# Patient Record
Sex: FEMALE | Race: WHITE | NOT HISPANIC OR LATINO | Employment: FULL TIME | URBAN - METROPOLITAN AREA
[De-identification: names, ages, dates, MRNs, and addresses within clinical notes are randomized per-mention and may not be internally consistent; named-entity substitution may affect disease eponyms.]

---

## 2017-05-11 LAB
HBV SURFACE AG SERPL QL IA: NONREACTIVE
HIV 1+2 AB+HIV1 P24 AG SERPL QL IA: NON REACTIVE
RUBELLA ANTIBODY IGG QUANTITATIVE: NORMAL IU/ML
T PALLIDUM IGG SER QL: NON REACTIVE

## 2017-10-21 ENCOUNTER — HOSPITAL ENCOUNTER (OUTPATIENT)
Facility: CLINIC | Age: 27
Discharge: HOME | End: 2017-10-21
Attending: OBSTETRICS & GYNECOLOGY | Admitting: OBSTETRICS & GYNECOLOGY
Payer: COMMERCIAL

## 2017-10-21 VITALS
TEMPERATURE: 97.9 F | SYSTOLIC BLOOD PRESSURE: 117 MMHG | HEIGHT: 67 IN | RESPIRATION RATE: 16 BRPM | DIASTOLIC BLOOD PRESSURE: 77 MMHG
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
## 2017-10-21 PROCEDURE — 59025 FETAL NON-STRESS TEST: CPT

## 2017-10-21 PROCEDURE — 99213 OFFICE O/P EST LOW 20 MIN: CPT | Mod: 25

## 2017-10-21 RX ORDER — ONDANSETRON 2 MG/ML
4 INJECTION INTRAMUSCULAR; INTRAVENOUS EVERY 6 HOURS PRN
Status: DISCONTINUED | OUTPATIENT
Start: 2017-10-21 | End: 2017-10-21 | Stop reason: HOSPADM

## 2017-10-21 RX ORDER — PRENATAL VIT/IRON FUM/FOLIC AC 27MG-0.8MG
1 TABLET ORAL DAILY
COMMUNITY
End: 2019-09-09

## 2017-10-21 NOTE — PROGRESS NOTES
2017    Suzie Gorman  [id number]            OB Admit History & Physical      Ms. Gorman  is here having tripped on the way in to a [employer] restaurant.  She landed on her right side, feeling that she scuffed her right hand, has a swollen right knee but did not hurt or land on her abdomen.  Baby is active, no contractions, no bleeding.  No abdominal pain    She has noticed baby active    No LMP recorded. Patient is pregnant.   Her Estimated Date of Delivery: 2017  , making her 35w3d  wks.      There is no height or weight on file to calculate BMI.  Her prenatal course has been complicated by non centrally located umbilical cord.    See prenatal for labs.  A+    Estimated fetal weight=        She is a 27 year old   Her OB history:   Obstetric History       T0      L0     SAB0   TAB0   Ectopic0   Multiple0   Live Births0       # Outcome Date GA Lbr Piotr/2nd Weight Sex Delivery Anes PTL Lv   1 Current                      No past medical history on file.     No past surgical history on file.      No current outpatient prescriptions on file.       Allergies: Review of patient's allergies indicates no known allergies.      REVIEW OF SYSTEMS:  NEUROLOGIC:  Negative  EYES:  Negative  ENT:  Negative  GI:  Negative  BREAST:  Negative  :  Negative  GYN:  Negative  CV:  Negative  PULMONARY:  Negative  MUSCULOSKELETAL:  Negative  PSYCH:  Negative        Social History     Social History     Marital status: N/A     Spouse name: N/A     Number of children: N/A     Years of education: N/A     Occupational History     Not on file.     Social History Main Topics     Smoking status: Never Smoker     Smokeless tobacco: Never Used     Alcohol use No     Drug use: Not on file     Sexual activity: Not on file     Other Topics Concern     Not on file     Social History Narrative     No narrative on file      No family history on file.          Vitals:   FHT 140s Category one,    With  contractions rarely    Alert Awake in NAD  HEENT grossly normal  Neck: no lymphadenopathy or thryoidomegaly  ABD gravid, non tender on exam   Pelvic:  no fluid noted, no blood noted  Cervix is closed   EXT:  no edema or calf tenderness  Neuro:  intact    Assessment:  IUP at 35w3d  With recent fall  PNC complicated by fall, abnormally located placental cord insertion, planned induction at 39 weeks, Growth curve changes with drop in overall growth    Plan:  DC to home.  Kick counts  Return if bleeding of lamonte    [unfilled]      Darren Rubio MD  Dept of OB/GYN  October 21, 2017

## 2017-10-21 NOTE — PROVIDER NOTIFICATION
10/21/17 1300   Provider Notification   Provider Name/Title Dr. Rubio   Method of Notification At Bedside   Request Evaluate in Person     Dr. Rubio at the bedside, reviewed fetal monitor tracing, reactive NST noted. Patient's cx examined and was closed and no bleeding noted. Patient denies any contractions. Orders for discharge received.

## 2017-10-21 NOTE — IP AVS SNAPSHOT
MRN:[id number]                      After Visit Summary   10/21/2017    Suzie Gorman    MRN: [id number]           Thank you!     Thank you for choosing [facility] for your care. Our goal is always to provide you with excellent care. Hearing back from our patients is one way we can continue to improve our services. Please take a few minutes to complete the written survey that you may receive in the mail after you visit. If you would like to speak to someone directly about your visit please contact Patient Relations at [phone]. Thank you!          Patient Information     Date Of Birth          1990        About your hospital stay     You were admitted on:  2017 You last received care in the:  [facility] Labor and Delivery    You were discharged on:  2017       Who to Call     For medical emergencies, please call 911.  For non-urgent questions about your medical care, please call your primary care provider or clinic, None          Attending Provider     Provider Darren Jansen OB/Gyn       Primary Care Provider    None Specified      Further instructions from your care team       Data: Patient [facility] at 1155.   Reason for maternal/fetal assessment per patient is Fall  . Patient is a . Prenatal record reviewed.      Obstetric History       T0      L0     SAB0   TAB0   Ectopic0   Multiple0   Live Births0       # Outcome Date GA Lbr Piotr/2nd Weight Sex Delivery Anes PTL Lv   1 Current                  Medical History: No past medical history on file.. Gestational Age 35w3d. VSS. Cervix: closed.  Fetal movement present. Patient denies cramping, backache, vaginal discharge, pelvic pressure, UTI symptoms, GI problems, bloody show, vaginal bleeding, edema, headache, visual disturbances, epigastric or URQ pain, abdominal pain, rupture of membranes. Support person Ja present.  Action: Verbal  "consent for EFM. Triage assessment completed. EFM applied for fetal non-stress test do to patient falling on her right knee and right elbow. Uterine assessment done with external uterine monitor. Fetal assessment: Presumed adequate fetal oxygenation documented (see flow record). Patient education given on fetal movement counts and . Patient instructed to report change in fetal movement, vaginal leaking of fluid or bleeding, abdominal pain, or any concerns related to the pregnancy to her nurse/physician.   Response: Dr. Rubio informed of patient's admission. Plan per provider is to obtain non-stress test and cervical exam. Patient verbalized understanding of education and verbalized agreement with plan. Discharged ambulatory at 1330.        Pending Results     No orders found from 10/19/2017 to 10/22/2017.            Admission Information     Date & Time Provider Department Dept. Phone    10/21/2017 Darren Rubio [facility] [place] Labor and Delivery [phone]      Your Vitals Were     Temperature Respirations Height             97.9  F (36.6  C) (Oral) 16 1.702 m (5' 7\")         [facility] Information     [facility] lets you send messages to your doctor, view your test results, renew your prescriptions, schedule appointments and more. To sign up, go to [email or web address] . Click on \"Log in\" on the left side of the screen, which will take you to the Welcome page. Then click on \"Sign up Now\" on the right side of the page.     You will be asked to enter the access code listed below, as well as some personal information. Please follow the directions to create your username and password.     Your access code is: [email or web address]  Expires: 2018  1:25 PM     Your access code will  in 90 days. If you need help or a new code, please call your [facility] or [phone].        Care EveryWhere ID     This is your Care EveryWhere ID. This could be used by other organizations to access your [facility] medical " records  [id number]        Equal Access to Services     MARIA ISABEL TY : Hadii bright Caballero, wablaiseda miles, qalinnea ojeda, noelle sorto. So [facility] [phone].    ATENCIÓN: Si habla español, tiene a arenas disposición servicios gratuitos de asistencia lingüística. Llame al [phone].    We comply with applicable federal civil rights laws and [place] laws. We do not discriminate on the basis of race, color, national origin, age, disability, sex, sexual orientation, or gender identity.               Review of your medicines      UNREVIEWED medicines. Ask your doctor about these medicines        Dose / Directions    prenatal multivitamin plus iron 27-0.8 MG Tabs per tablet        Dose:  1 tablet   Take 1 tablet by mouth daily   Refills:  0                Protect others around you: Learn how to safely use, store and throw away your medicines at www.disposemymeds.org.             Medication List: This is a list of all your medications and when to take them. Check marks below indicate your daily home schedule. Keep this list as a reference.      Medications           Morning Afternoon Evening Bedtime As Needed    prenatal multivitamin plus iron 27-0.8 MG Tabs per tablet   Take 1 tablet by mouth daily

## 2017-10-21 NOTE — PLAN OF CARE
Reviewed printed discharge instructions with patient and verbalized understanding of them. Reviewed kick counts and patient reports she has been doing these. Patient home undelivered, accompanied with her boyfriend Ja. Instructed to notify MD with changes in fetal movement, contractions, SROM or any other concerns.

## 2017-10-21 NOTE — PLAN OF CARE
presents to L&D for evaluation after she fell on her on right knee and right elbow/shoulder and not sure if abdomen hit. Denies LOF or vaginal bleeding. Patient not aware of fetal monitor but does report she has an anterior placenta and placement of umbilical cord was brought up as a concern. External fetal monitor applied and data base completed..

## 2017-10-21 NOTE — IP AVS SNAPSHOT
[facility] Labor and Delivery    [address]    [facility] [address]    Phone:  [phone]    Fax:  [phone]                                       After Visit Summary   10/21/2017    Suzie Gorman    MRN: [id number]           After Visit Summary Signature Page     I have received my discharge instructions, and my questions have been answered. I have discussed any challenges I see with this plan with the nurse or doctor.    ..........................................................................................................................................  Patient/Patient Representative Signature      ..........................................................................................................................................  Patient Representative Print Name and Relationship to Patient    ..................................................               ................................................  Date                                            Time    ..........................................................................................................................................  Reviewed by Signature/Title    ...................................................              ..............................................  Date                                                            Time

## 2017-10-26 LAB — GROUP B STREP PCR: NEGATIVE

## 2017-11-13 ENCOUNTER — HOSPITAL ENCOUNTER (OUTPATIENT)
Facility: CLINIC | Age: 27
Discharge: HOME | End: 2017-11-13
Attending: OBSTETRICS & GYNECOLOGY | Admitting: OBSTETRICS & GYNECOLOGY
Payer: COMMERCIAL

## 2017-11-13 VITALS — RESPIRATION RATE: 18 BRPM | TEMPERATURE: 97.8 F | SYSTOLIC BLOOD PRESSURE: 119 MMHG | DIASTOLIC BLOOD PRESSURE: 73 MMHG

## 2017-11-13 PROBLEM — Z36.89 ENCOUNTER FOR TRIAGE IN PREGNANT PATIENT: Status: ACTIVE | Noted: 2017-11-13

## 2017-11-13 LAB — A1 MICROGLOB PLACENTAL VAG QL: NEGATIVE

## 2017-11-13 PROCEDURE — 84112 EVAL AMNIOTIC FLUID PROTEIN: CPT | Performed by: OBSTETRICS & GYNECOLOGY

## 2017-11-13 PROCEDURE — 99214 OFFICE O/P EST MOD 30 MIN: CPT

## 2017-11-13 NOTE — IP AVS SNAPSHOT
MRN:[id number]                      After Visit Summary   11/13/2017    Suzie Gorman    MRN: [id number]           Thank you!     Thank you for choosing [facility] for your care. Our goal is always to provide you with excellent care. Hearing back from our patients is one way we can continue to improve our services. Please take a few minutes to complete the written survey that you may receive in the mail after you visit. If you would like to speak to someone directly about your visit please contact Patient Relations at [phone]. Thank you!          Patient Information     Date Of Birth          1990        About your hospital stay     You were admitted on:  November 13, 2017 You last received care in the:  [facility] Labor and Delivery    You were discharged on:  November 13, 2017       Who to Call     For medical emergencies, please call 911.  For non-urgent questions about your medical care, please call your primary care provider or clinic, [phone]          Attending Provider     Provider Specialty    Christina Rodriguez,  OB/Gyn       Primary Care Provider Office Phone # Fax #    Darren Rubio [phone]      Further instructions from your care team       Discharge Instruction for Undelivered Patients      You were seen for: Membrane Assessment  We Consulted: Dr. Rodriguez  You had (Test or Medicine):amnisure, fetal and uterine monitoing.     Diet:   Drink 8 to 12 glasses of liquids (milk, juice, water) every day.  You may eat meals and snacks.     Activity:  Call your doctor or nurse midwife if your baby is moving less than usual.     Call your provider if you notice:  Swelling in your face or increased swelling in your hands or legs.  Headaches that are not relieved by Tylenol (acetaminophen).  Changes in your vision (blurring: seeing spots or stars.)  Nausea (sick to your stomach) and vomiting (throwing up).   Weight gain of 5 pounds or more  "per week.  Heartburn that doesn't go away.  Signs of bladder infection: pain when you urinate (use the toilet), need to go more often and more urgently.  The bag of alonzo (rupture of membranes) breaks, or you notice leaking in your underwear.  Bright red blood in your underwear.  Abdominal (lower belly) or stomach pain.  For first baby: Contractions (tightening) less than 5 minutes apart for one hour or more.  Second (plus) baby: Contractions (tightening) less than 10 minutes apart and getting stronger.  *If less than 34 weeks: Contractions (tightenings) more than 6 times in one hour.  Increase or change in vaginal discharge (note the color and amount)  Other: Call doctor if further leaking of fluids, questions or concerns    Follow-up:  Induction scheduled for Wednesday, November 15.          Pending Results     No orders found from 2017 to 2017.            Admission Information     Date & Time Provider Department Dept. Phone    2017 Christina Rodriguez,  [facility] Labor and Delivery [phone]      Your Vitals Were     Blood Pressure Temperature Respirations             119/73 97.8  F (36.6  C) (Oral) 18         MyChart Information     [facility] lets you send messages to your doctor, view your test results, renew your prescriptions, schedule appointments and more. To sign up, go to [email or web address] . Click on \"Log in\" on the left side of the screen, which will take you to the Welcome page. Then click on \"Sign up Now\" on the right side of the page.     You will be asked to enter the access code listed below, as well as some personal information. Please follow the directions to create your username and password.     Your access code is: [email or web address]  Expires: 2018 12:25 PM     Your access code will  in 90 days. If you need help or a new code, please call your [facility] or [phone].        Care EveryWhere ID     This is your Care EveryWhere ID. This could be used " by other organizations to access your [place] medical records  [id number]        Equal Access to Services     MARIA ISABEL TY : Hadii bright Caballero, lucy aquino, cherelle lockemaabigail ojeda, noelle sorto. So [facility] [phone].    ATENCIÓN: Si habla español, tiene a arenas disposición servicios gratuitos de asistencia lingüística. Llame al [phone].    We comply with applicable federal civil rights laws and [place] laws. We do not discriminate on the basis of race, color, national origin, age, disability, sex, sexual orientation, or gender identity.               Review of your medicines      UNREVIEWED medicines. Ask your doctor about these medicines        Dose / Directions    prenatal multivitamin plus iron 27-0.8 MG Tabs per tablet        Dose:  1 tablet   Take 1 tablet by mouth daily   Refills:  0                Protect others around you: Learn how to safely use, store and throw away your medicines at www.disposemymeds.org.             Medication List: This is a list of all your medications and when to take them. Check marks below indicate your daily home schedule. Keep this list as a reference.      Medications           Morning Afternoon Evening Bedtime As Needed    prenatal multivitamin plus iron 27-0.8 MG Tabs per tablet   Take 1 tablet by mouth daily

## 2017-11-13 NOTE — PLAN OF CARE
, 38 5/7 weeks gestation. Had episode of leaking fluid at 0430. Monitors explained and applied. Occasional contraction noted on monitor, denies feeling. Amnisure specimen to lab. Amnisure negative. Reactive fetal tracing. Dr. Rodriguez updated per phone. Order received for discharge. Patient to return Wednesday for induction.

## 2017-11-13 NOTE — PROVIDER NOTIFICATION
11/13/17 1425   Provider Notification   Provider Name/Title Dr. Rodriguez   Method of Notification Phone   Request Evaluate - Remote   Amnisure negative, Dr. Rodriguez updatd per phone. Order received for discharge. Patient to call if further leaking questions or concerns. Discharge instructions reviewed with patient. Understanding verbalized. Patient discharged to home. Will return Wednesday for induction.

## 2017-11-13 NOTE — DISCHARGE INSTRUCTIONS
Discharge Instruction for Undelivered Patients      You were seen for: Membrane Assessment  We Consulted: Dr. Rodriguez  You had (Test or Medicine):amnisure, fetal and uterine monitoing.     Diet:   Drink 8 to 12 glasses of liquids (milk, juice, water) every day.  You may eat meals and snacks.     Activity:  Call your doctor or nurse midwife if your baby is moving less than usual.     Call your provider if you notice:  Swelling in your face or increased swelling in your hands or legs.  Headaches that are not relieved by Tylenol (acetaminophen).  Changes in your vision (blurring: seeing spots or stars.)  Nausea (sick to your stomach) and vomiting (throwing up).   Weight gain of 5 pounds or more per week.  Heartburn that doesn't go away.  Signs of bladder infection: pain when you urinate (use the toilet), need to go more often and more urgently.  The bag of alonzo (rupture of membranes) breaks, or you notice leaking in your underwear.  Bright red blood in your underwear.  Abdominal (lower belly) or stomach pain.  For first baby: Contractions (tightening) less than 5 minutes apart for one hour or more.  Second (plus) baby: Contractions (tightening) less than 10 minutes apart and getting stronger.  *If less than 34 weeks: Contractions (tightenings) more than 6 times in one hour.  Increase or change in vaginal discharge (note the color and amount)  Other: Call doctor if further leaking of fluids, questions or concerns    Follow-up:  Induction scheduled for Wednesday, November 15.

## 2017-11-13 NOTE — IP AVS SNAPSHOT
[facility] Labor and Delivery    [address]    [facility] [address]    Phone:  [phone]    Fax:  [phone]                                       After Visit Summary   11/13/2017    Suzie Gorman    MRN: [id number]           After Visit Summary Signature Page     I have received my discharge instructions, and my questions have been answered. I have discussed any challenges I see with this plan with the nurse or doctor.    ..........................................................................................................................................  Patient/Patient Representative Signature      ..........................................................................................................................................  Patient Representative Print Name and Relationship to Patient    ..................................................               ................................................  Date                                            Time    ..........................................................................................................................................  Reviewed by Signature/Title    ...................................................              ..............................................  Date                                                            Time

## 2017-11-15 ENCOUNTER — HOSPITAL ENCOUNTER (INPATIENT)
Facility: CLINIC | Age: 27
LOS: 3 days | Discharge: HOME HEALTH | End: 2017-11-18
Attending: OBSTETRICS & GYNECOLOGY | Admitting: OBSTETRICS & GYNECOLOGY
Payer: COMMERCIAL

## 2017-11-15 ENCOUNTER — ANESTHESIA EVENT (OUTPATIENT)
Dept: OBGYN | Facility: CLINIC | Age: 27
End: 2017-11-15
Payer: COMMERCIAL

## 2017-11-15 ENCOUNTER — ANESTHESIA (OUTPATIENT)
Dept: OBGYN | Facility: CLINIC | Age: 27
End: 2017-11-15
Payer: COMMERCIAL

## 2017-11-15 LAB
ABO + RH BLD: NORMAL
ABO + RH BLD: NORMAL
HGB BLD-MCNC: 11.1 G/DL (ref 11.7–15.7)
SPECIMEN EXP DATE BLD: NORMAL
T PALLIDUM IGG+IGM SER QL: NEGATIVE

## 2017-11-15 PROCEDURE — 25000125 ZZHC RX 250: Performed by: OBSTETRICS & GYNECOLOGY

## 2017-11-15 PROCEDURE — 86901 BLOOD TYPING SEROLOGIC RH(D): CPT | Performed by: OBSTETRICS & GYNECOLOGY

## 2017-11-15 PROCEDURE — 00HU33Z INSERTION OF INFUSION DEVICE INTO SPINAL CANAL, PERCUTANEOUS APPROACH: ICD-10-PCS | Performed by: OBSTETRICS & GYNECOLOGY

## 2017-11-15 PROCEDURE — 86900 BLOOD TYPING SEROLOGIC ABO: CPT | Performed by: OBSTETRICS & GYNECOLOGY

## 2017-11-15 PROCEDURE — 40000671 ZZH STATISTIC ANESTHESIA CASE

## 2017-11-15 PROCEDURE — 12000031 ZZH R&B OB CRITICAL

## 2017-11-15 PROCEDURE — 25000128 H RX IP 250 OP 636: Performed by: OBSTETRICS & GYNECOLOGY

## 2017-11-15 PROCEDURE — 3E033VJ INTRODUCTION OF OTHER HORMONE INTO PERIPHERAL VEIN, PERCUTANEOUS APPROACH: ICD-10-PCS | Performed by: OBSTETRICS & GYNECOLOGY

## 2017-11-15 PROCEDURE — 37000011 ZZH ANESTHESIA WARD SERVICE

## 2017-11-15 PROCEDURE — 25000125 ZZHC RX 250: Performed by: ANESTHESIOLOGY

## 2017-11-15 PROCEDURE — 25000128 H RX IP 250 OP 636

## 2017-11-15 PROCEDURE — S0020 INJECTION, BUPIVICAINE HYDRO: HCPCS | Performed by: ANESTHESIOLOGY

## 2017-11-15 PROCEDURE — 85018 HEMOGLOBIN: CPT | Performed by: OBSTETRICS & GYNECOLOGY

## 2017-11-15 PROCEDURE — 0KQM0ZZ REPAIR PERINEUM MUSCLE, OPEN APPROACH: ICD-10-PCS | Performed by: OBSTETRICS & GYNECOLOGY

## 2017-11-15 PROCEDURE — 86780 TREPONEMA PALLIDUM: CPT | Performed by: OBSTETRICS & GYNECOLOGY

## 2017-11-15 PROCEDURE — 3E0R3BZ INTRODUCTION OF ANESTHETIC AGENT INTO SPINAL CANAL, PERCUTANEOUS APPROACH: ICD-10-PCS | Performed by: OBSTETRICS & GYNECOLOGY

## 2017-11-15 RX ORDER — OXYTOCIN 10 [USP'U]/ML
10 INJECTION, SOLUTION INTRAMUSCULAR; INTRAVENOUS
Status: DISCONTINUED | OUTPATIENT
Start: 2017-11-15 | End: 2017-11-16

## 2017-11-15 RX ORDER — EPHEDRINE SULFATE 50 MG/ML
INJECTION, SOLUTION INTRAMUSCULAR; INTRAVENOUS; SUBCUTANEOUS
Status: DISCONTINUED
Start: 2017-11-15 | End: 2017-11-16 | Stop reason: HOSPADM

## 2017-11-15 RX ORDER — CARBOPROST TROMETHAMINE 250 UG/ML
250 INJECTION, SOLUTION INTRAMUSCULAR
Status: DISCONTINUED | OUTPATIENT
Start: 2017-11-15 | End: 2017-11-16

## 2017-11-15 RX ORDER — ONDANSETRON 2 MG/ML
4 INJECTION INTRAMUSCULAR; INTRAVENOUS EVERY 6 HOURS PRN
Status: DISCONTINUED | OUTPATIENT
Start: 2017-11-15 | End: 2017-11-16

## 2017-11-15 RX ORDER — EPHEDRINE SULFATE 50 MG/ML
5 INJECTION, SOLUTION INTRAMUSCULAR; INTRAVENOUS; SUBCUTANEOUS
Status: DISCONTINUED | OUTPATIENT
Start: 2017-11-15 | End: 2017-11-18 | Stop reason: HOSPADM

## 2017-11-15 RX ORDER — FENTANYL CITRATE 50 UG/ML
50-100 INJECTION, SOLUTION INTRAMUSCULAR; INTRAVENOUS
Status: DISCONTINUED | OUTPATIENT
Start: 2017-11-15 | End: 2017-11-16

## 2017-11-15 RX ORDER — IBUPROFEN 800 MG/1
800 TABLET, FILM COATED ORAL
Status: DISCONTINUED | OUTPATIENT
Start: 2017-11-15 | End: 2017-11-16

## 2017-11-15 RX ORDER — OXYTOCIN/0.9 % SODIUM CHLORIDE 30/500 ML
100-340 PLASTIC BAG, INJECTION (ML) INTRAVENOUS CONTINUOUS PRN
Status: COMPLETED | OUTPATIENT
Start: 2017-11-15 | End: 2017-11-16

## 2017-11-15 RX ORDER — OXYCODONE AND ACETAMINOPHEN 5; 325 MG/1; MG/1
1 TABLET ORAL
Status: DISCONTINUED | OUTPATIENT
Start: 2017-11-15 | End: 2017-11-16

## 2017-11-15 RX ORDER — LIDOCAINE 40 MG/G
CREAM TOPICAL
Status: DISCONTINUED | OUTPATIENT
Start: 2017-11-15 | End: 2017-11-16

## 2017-11-15 RX ORDER — METHYLERGONOVINE MALEATE 0.2 MG/ML
200 INJECTION INTRAVENOUS
Status: DISCONTINUED | OUTPATIENT
Start: 2017-11-15 | End: 2017-11-16

## 2017-11-15 RX ORDER — SODIUM CHLORIDE, SODIUM LACTATE, POTASSIUM CHLORIDE, CALCIUM CHLORIDE 600; 310; 30; 20 MG/100ML; MG/100ML; MG/100ML; MG/100ML
INJECTION, SOLUTION INTRAVENOUS CONTINUOUS
Status: DISCONTINUED | OUTPATIENT
Start: 2017-11-15 | End: 2017-11-16

## 2017-11-15 RX ORDER — OXYTOCIN/0.9 % SODIUM CHLORIDE 30/500 ML
1-24 PLASTIC BAG, INJECTION (ML) INTRAVENOUS CONTINUOUS
Status: DISCONTINUED | OUTPATIENT
Start: 2017-11-15 | End: 2017-11-16

## 2017-11-15 RX ORDER — BUPIVACAINE HYDROCHLORIDE 2.5 MG/ML
INJECTION, SOLUTION EPIDURAL; INFILTRATION; INTRACAUDAL PRN
Status: DISCONTINUED | OUTPATIENT
Start: 2017-11-15 | End: 2017-11-15

## 2017-11-15 RX ORDER — NALOXONE HYDROCHLORIDE 0.4 MG/ML
.1-.4 INJECTION, SOLUTION INTRAMUSCULAR; INTRAVENOUS; SUBCUTANEOUS
Status: DISCONTINUED | OUTPATIENT
Start: 2017-11-15 | End: 2017-11-16

## 2017-11-15 RX ORDER — ACETAMINOPHEN 325 MG/1
650 TABLET ORAL EVERY 4 HOURS PRN
Status: DISCONTINUED | OUTPATIENT
Start: 2017-11-15 | End: 2017-11-16

## 2017-11-15 RX ADMIN — Medication 15 ML/HR: at 22:10

## 2017-11-15 RX ADMIN — BUPIVACAINE HYDROCHLORIDE 10 ML: 2.5 INJECTION, SOLUTION EPIDURAL; INFILTRATION; INTRACAUDAL at 22:12

## 2017-11-15 RX ADMIN — SODIUM CHLORIDE, POTASSIUM CHLORIDE, SODIUM LACTATE AND CALCIUM CHLORIDE: 600; 310; 30; 20 INJECTION, SOLUTION INTRAVENOUS at 15:55

## 2017-11-15 RX ADMIN — FENTANYL CITRATE 100 MCG: 50 INJECTION INTRAMUSCULAR; INTRAVENOUS at 20:49

## 2017-11-15 RX ADMIN — OXYTOCIN-SODIUM CHLORIDE 0.9% IV SOLN 30 UNIT/500ML 2 MILLI-UNITS/MIN: 30-0.9/5 SOLUTION at 08:47

## 2017-11-15 RX ADMIN — SODIUM CHLORIDE, POTASSIUM CHLORIDE, SODIUM LACTATE AND CALCIUM CHLORIDE 125 ML/HR: 600; 310; 30; 20 INJECTION, SOLUTION INTRAVENOUS at 08:47

## 2017-11-15 RX ADMIN — SODIUM CHLORIDE, POTASSIUM CHLORIDE, SODIUM LACTATE AND CALCIUM CHLORIDE 1000 ML: 600; 310; 30; 20 INJECTION, SOLUTION INTRAVENOUS at 22:01

## 2017-11-15 NOTE — IP AVS SNAPSHOT
[facility] Postpartum    [address]    [facility] [address]    Phone:  [phone]    Fax:  [phone]                                       After Visit Summary   11/15/2017    Suzie Gorman    MRN: [id number]           After Visit Summary Signature Page     I have received my discharge instructions, and my questions have been answered. I have discussed any challenges I see with this plan with the nurse or doctor.    ..........................................................................................................................................  Patient/Patient Representative Signature      ..........................................................................................................................................  Patient Representative Print Name and Relationship to Patient    ..................................................               ................................................  Date                                            Time    ..........................................................................................................................................  Reviewed by Signature/Title    ...................................................              ..............................................  Date                                                            Time

## 2017-11-15 NOTE — PROVIDER NOTIFICATION
11/15/17 1443   Provider Notification   Provider Name/Title shalini   Method of Notification At Bedside   Notification Reason SVE

## 2017-11-15 NOTE — LETTER
[facility] Postpartum Home Care Referral  [facility] POSTPARTUM  [address]  [facility] [address]  Phone: [phone]  Fax: [phone]    Date of Referral: 2017    Suzie Gorman MRN# [id number]   Age: 27 year old YOB: 1990           Date of Admission:  11/15/2017  7:37 AM    Primary care provider: Darren Rubio  Attending Provider: Sondra Mars*    Payor: [facility] / Plan: [facility] CARE SERVICE/BLUE LINK / Product Type: PPO /          Pregnancy History:   The details of the mother's pregnancy are as follows:  OBSTETRIC HISTORY:  This patient's mother is not on file.  EDC: This patient's mother is not on file.  This patient's mother is not on file.    Prenatal Labs: This patient's mother is not on file.    GBS Status:  This patient's mother is not on file.           Maternal History:   This patient's mother is not on file.                      Family History:   No family history on file.          Social History:   This patient's mother is not on file.       Birth  History:      Birth Information  No birth history on file.    There is no immunization history for the selected administration types on file for this patient.          Information     Feeding plan:       Latch:      Vitals  Pulse: 78  Resp: 18  Temp: 97.7  F (36.5  C)  Temp src: Oral  BP: 107/70        Weight: 90.3 kg (199 lb)   Percent Weight Change Since Birth: 0             Bilirubin Results:   No results for input(s): TCBIL, BILINEONATAL in the last 67439 hours.         Discharge Meds:      Suzie Gorman   Home Medication Instructions LUCRETIA:[id number]    Printed on:17 1030   Medication Information                      ibuprofen (ADVIL/MOTRIN) 600 MG tablet  Take 1 tablet (600 mg) by mouth every 6 hours as needed for other (carmping)             Prenatal Vit-Fe Fumarate-FA (PRENATAL MULTIVITAMIN PLUS IRON) 27-0.8 MG TABS per tablet  Take 1 tablet by  mouth daily             senna-docusate (SENOKOT-S;PERICOLACE) 8.6-50 MG per tablet  Take 2 tablets by mouth 2 times daily as needed for constipation                 This patient's mother is not on file.        Summary of Plan of Care:     Home Care to draw  Screen? No    Home Care Agency referred to: [facility]    Primp here from [facility]. Baby under phototherapy. Can you please call pt in couple of days for visit. May be going home tomorrow. Primp having problems nursing.     Nora Bond LPN

## 2017-11-15 NOTE — PROGRESS NOTES
"LABOR NOTE    Subjective: Reports feeling cramping. Rated 5/10.    Objective:  /85  Pulse 104  Temp 98  F (36.7  C) (Oral)  Resp 16  Ht 1.702 m (5' 7\")  Wt 90.3 kg (199 lb)  BMI 31.17 kg/m2   FHT: 130, mod rose marie, accels  [place]: q 2-3 min  SVE: last by myself at 1443  1.5/70/-1    Assessment and Plan:  FHT category 1. Continue induction with pitocin. No pain medications received thus far.     "

## 2017-11-15 NOTE — H&P
[facility]     History and Physical  Obstetrics and Gynecology     Date of Admission:  11/15/2017    History of Present Illness   Suzie Gorman is a 27 year old female  39w0d with Estimated Date of Delivery: 2017 calculated from LMP consistent with 12 week ultrasound.  [facility].  Indication velamentous cord insertion, fetal growth restriction with AC 9% on 10/26 at 39 weeks. Plan per perinatology is for induction of labor.     PRENATAL COURSE  Prenatal course was complicated by velamentous cord insertion, fetal growth restriction, persistent right intrahepatic vein of the fetus, unplanned pregnancy.    Rh pos  Rubella immune  Hep B non immune     Past Medical History    I have reviewed this patient's medical history and updated it with pertinent information if needed.   Past Medical History:   Diagnosis Date     Varicella        Past Surgical History   Past surgical history reviewed with no previous surgeries identified.    Prior to Admission Medications   Prior to Admission Medications   Prescriptions Last Dose Informant Patient Reported? Taking?   Prenatal Vit-Fe Fumarate-FA (PRENATAL MULTIVITAMIN PLUS IRON) 27-0.8 MG TABS per tablet 2017 at Unknown time  Yes Yes   Sig: Take 1 tablet by mouth daily      Facility-Administered Medications: None     Allergies   No Known Allergies    Social History   I have reviewed this patient's social history and updated it with pertinent information if needed. Suzie Gorman  reports that she has never smoked. She has never used smokeless tobacco. She reports that she does not drink alcohol or use illicit drugs.    Family History   I have reviewed this patient's family history and updated it with pertinent information if needed.   No family history on file.    Immunization History   Influenza vaccination status: Indicated for current flu vaccination season Oct. to Feb.  Declined flu shot during  pregnancy. Will offer again postpartum. Is status post Tdap.    Physical Exam   Temp: 98.2  F (36.8  C) Temp src: Oral BP: 121/82 Pulse: 104   Resp: 18        Vital Signs with Ranges  Temp:  [98.2  F (36.8  C)] 98.2  F (36.8  C)  Pulse:  [104] 104  Resp:  [18] 18  BP: (121)/(82) 121/82  Fetal Heart Tones: 135 baseline, moderate variablility, + accels and no decels  TOCO: no contractions    Constitutional: healthy, alert, active and no distress   Respiratory: no increased work of breathing  Cardiovascular: regular rate and rhythm  Abdomen: gravid, single vertex fetus, non-tender, EFW 7 lb  Cervical Exam: 1/ 50/ Anterior/ average/ -1     PATEL: 7  Skin/Extremites: no bruising or bleeding  Neurologic: Mental Status Exam:  Level of Alertness:   awake  Orientation:   person, place, time  Neuropsychiatric: Memory and insight: normal, memory for past and recent events intact and thought process normal    Assessment & Plan   Suzie Gorman is a 27 year old female  who presents at 39w0d with perinatology recommendation for MIOL due to velamentous cord insertion and fetal growth restriction. Will watch fetal tolerance to labor closely. Caution with placental cord traction in third stage.   GBS negative.  NST reactive.   Rubella immune. Hep B non reactive.   Labor induction with Pitocin.    Sondra Mars MD

## 2017-11-15 NOTE — PROGRESS NOTES
Review of PN chart. No cervical exam since 37 weeks with PATEL of 3 at that time, as patient reported during discussion.  Offered return tonight and cervidil overnight. Offered cervidil now then pitocin. Discussed PATEL of 7 today.   Patient and FOB desire pitocin. Understand cervix not truly favorable.

## 2017-11-15 NOTE — IP AVS SNAPSHOT
MRN:[id number]                      After Visit Summary   11/15/2017    Suzie Gorman    MRN: [id number]           Thank you!     Thank you for choosing [facility] for your care. Our goal is always to provide you with excellent care. Hearing back from our patients is one way we can continue to improve our services. Please take a few minutes to complete the written survey that you may receive in the mail after you visit. If you would like to speak to someone directly about your visit please contact Patient Relations at [phone]. Thank you!          Patient Information     Date Of Birth          1990        Designated Caregiver       Most Recent Value    Caregiver    Will someone help with your care after discharge? no      About your hospital stay     You were admitted on:  November 15, 2017 You last received care in the:  [facility]    You were discharged on:  November 18, 2017        Reason for your hospital stay       Maternity care                  Who to Call     For medical emergencies, please call 911.  For non-urgent questions about your medical care, please call your primary care provider or clinic, [phone]          Attending Provider     Provider Specialty    Sondra Mars MD OB/Gyn       Primary Care Provider Office Phone # Fax #    Darren Rubio [phone]      After Care Instructions     Activity       Review discharge instructions            Diet       Resume previous diet            Discharge Instructions - Postpartum visit       Schedule postpartum visit with your provider and return to clinic in 6 weeks.                  Further instructions from your care team       Postpartum Vaginal Delivery Instructions  Lactation [phone]  [facility] [phone]    Activity       Ask family and friends for help when you need it.    Do not place anything in your vagina for 6 weeks.    You are not  restricted on other activities, but take it easy for a few weeks to allow your body to recover from delivery.  You are able to do any activities you feel up to that point.    No driving until you have stopped taking your pain medications (usually two weeks after delivery).     Call your health care provider if you have any of these symptoms:       Increased pain, swelling, redness, or fluid around your stiches from an episiotomy or perineal tear.    A fever above 100.4 F (38 C) with or without chills when placing a thermometer under your tongue.    You soak a sanitary pad with blood within 1 hour, or you see blood clots larger than a golf ball.    Bleeding that lasts more than 6 weeks.    Vaginal discharge that smells bad.    Severe pain, cramping or tenderness in your lower belly area.    A need to urinate more frequently (use the toilet more often), more urgently (use the toilet very quickly), or it burns when you urinate.    Nausea and vomiting.    Redness, swelling or pain around a vein in your leg.    Problems breastfeeding or a red or painful area on your breast.    Chest pain and cough or are gasping for air.    Problems coping with sadness, anxiety, or depression.  If you have any concerns about hurting yourself or the baby, call your provider immediately.     You have questions or concerns after you return home.     Keep your hands clean:  Always wash your hands before touching your perineal area and stitches.  This helps reduce your risk of infection.  If your hands aren't dirty, you may use an alcohol hand-rub to clean your hands. Keep your nails clean and short.        Pending Results     No orders found from 11/13/2017 to 11/16/2017.            Statement of Approval     Ordered          11/18/17 0831  I have reviewed and agree with all the recommendations and orders detailed in this document.  EFFECTIVE NOW     Approved and electronically signed by:  Christina Rodriguez, DO             Admission  "Information     Date & Time Provider Department Dept. Phone    11/15/2017 Sondra Mars MD [facility] [phone]      Your Vitals Were     Blood Pressure Pulse Temperature Respirations Height Weight    101/48 65 97.5  F (36.4  C) (Oral) 16 1.702 m (5' 7\") 90.3 kg (199 lb)    BMI (Body Mass Index)                   31.17 kg/m2           MyChart Information     [facility] lets you send messages to your doctor, view your test results, renew your prescriptions, schedule appointments and more. To sign up, go to [email or web address] . Click on \"Log in\" on the left side of the screen, which will take you to the Welcome page. Then click on \"Sign up Now\" on the right side of the page.     You will be asked to enter the access code listed below, as well as some personal information. Please follow the directions to create your username and password.     Your access code is: [email or web address]  Expires: 2018 12:25 PM     Your access code will  in 90 days. If you need help or a new code, please call your [place] clinic or [phone].        Care EveryWhere ID     This is your Care EveryWhere ID. This could be used by other organizations to access your [place] medical records  [id number]        Equal Access to Services     MARIA ISABEL TY AH: Hadii bright moss Sokristina, waaxda luqadaha, qaybta kaalmada lynette, noelle crowley . So [facility] [phone].    ATENCIÓN: Si habla español, tiene a arenas disposición servicios gratuitos de asistencia lingüística. Lidia al [phone].    We comply with applicable federal civil rights laws and [place] laws. We do not discriminate on the basis of race, color, national origin, age, disability, sex, sexual orientation, or gender identity.               Review of your medicines      START taking        Dose / Directions    ibuprofen 600 MG tablet   Commonly known as:  ADVIL/MOTRIN   Used for:   (spontaneous vaginal delivery) "        Dose:  600 mg   Take 1 tablet (600 mg) by mouth every 6 hours as needed for other (carmping)   Quantity:  60 tablet   Refills:  0       senna-docusate 8.6-50 MG per tablet   Commonly known as:  SENOKOT-S;PERICOLACE   Used for:   (spontaneous vaginal delivery)        Dose:  2 tablet   Take 2 tablets by mouth 2 times daily as needed for constipation   Quantity:  60 tablet   Refills:  0         CONTINUE these medicines which have NOT CHANGED        Dose / Directions    prenatal multivitamin plus iron 27-0.8 MG Tabs per tablet        Dose:  1 tablet   Take 1 tablet by mouth daily   Refills:  0            Where to get your medicines      Some of these will need a paper prescription and others can be bought over the counter. Ask your nurse if you have questions.     Bring a paper prescription for each of these medications     ibuprofen 600 MG tablet    senna-docusate 8.6-50 MG per tablet                Protect others around you: Learn how to safely use, store and throw away your medicines at www.disposemymeds.org.             Medication List: This is a list of all your medications and when to take them. Check marks below indicate your daily home schedule. Keep this list as a reference.      Medications           Morning Afternoon Evening Bedtime As Needed    ibuprofen 600 MG tablet   Commonly known as:  ADVIL/MOTRIN   Take 1 tablet (600 mg) by mouth every 6 hours as needed for other (carmping)   Last time this was given:  800 mg on 2017  3:10 AM                                prenatal multivitamin plus iron 27-0.8 MG Tabs per tablet   Take 1 tablet by mouth daily                                senna-docusate 8.6-50 MG per tablet   Commonly known as:  SENOKOT-S;PERICOLACE   Take 2 tablets by mouth 2 times daily as needed for constipation   Last time this was given:  2 tablets on 2017  8:12 AM

## 2017-11-16 PROCEDURE — 72200001 ZZH LABOR CARE VAGINAL DELIVERY SINGLE

## 2017-11-16 PROCEDURE — 12000029 ZZH R&B OB INTERMEDIATE

## 2017-11-16 PROCEDURE — 25000125 ZZHC RX 250: Performed by: OBSTETRICS & GYNECOLOGY

## 2017-11-16 PROCEDURE — 40000083 ZZH STATISTIC IP LACTATION SERVICES 1-15 MIN

## 2017-11-16 PROCEDURE — 25000132 ZZH RX MED GY IP 250 OP 250 PS 637: Performed by: OBSTETRICS & GYNECOLOGY

## 2017-11-16 RX ORDER — OXYTOCIN/0.9 % SODIUM CHLORIDE 30/500 ML
340 PLASTIC BAG, INJECTION (ML) INTRAVENOUS CONTINUOUS PRN
Status: DISCONTINUED | OUTPATIENT
Start: 2017-11-16 | End: 2017-11-18 | Stop reason: HOSPADM

## 2017-11-16 RX ORDER — HYDROMORPHONE HYDROCHLORIDE 1 MG/ML
.3-.5 INJECTION, SOLUTION INTRAMUSCULAR; INTRAVENOUS; SUBCUTANEOUS EVERY 30 MIN PRN
Status: DISCONTINUED | OUTPATIENT
Start: 2017-11-16 | End: 2017-11-18 | Stop reason: HOSPADM

## 2017-11-16 RX ORDER — AMOXICILLIN 250 MG
2 CAPSULE ORAL 2 TIMES DAILY PRN
Status: DISCONTINUED | OUTPATIENT
Start: 2017-11-16 | End: 2017-11-18 | Stop reason: HOSPADM

## 2017-11-16 RX ORDER — OXYCODONE HYDROCHLORIDE 5 MG/1
5 TABLET ORAL EVERY 4 HOURS PRN
Status: DISCONTINUED | OUTPATIENT
Start: 2017-11-16 | End: 2017-11-18 | Stop reason: HOSPADM

## 2017-11-16 RX ORDER — MISOPROSTOL 200 UG/1
800 TABLET ORAL
Status: DISCONTINUED | OUTPATIENT
Start: 2017-11-16 | End: 2017-11-18 | Stop reason: HOSPADM

## 2017-11-16 RX ORDER — ACETAMINOPHEN 325 MG/1
650 TABLET ORAL EVERY 4 HOURS PRN
Status: DISCONTINUED | OUTPATIENT
Start: 2017-11-16 | End: 2017-11-18 | Stop reason: HOSPADM

## 2017-11-16 RX ORDER — BISACODYL 10 MG
10 SUPPOSITORY, RECTAL RECTAL DAILY PRN
Status: DISCONTINUED | OUTPATIENT
Start: 2017-11-18 | End: 2017-11-18 | Stop reason: HOSPADM

## 2017-11-16 RX ORDER — OXYTOCIN 10 [USP'U]/ML
10 INJECTION, SOLUTION INTRAMUSCULAR; INTRAVENOUS
Status: DISCONTINUED | OUTPATIENT
Start: 2017-11-16 | End: 2017-11-18 | Stop reason: HOSPADM

## 2017-11-16 RX ORDER — NALOXONE HYDROCHLORIDE 0.4 MG/ML
.1-.4 INJECTION, SOLUTION INTRAMUSCULAR; INTRAVENOUS; SUBCUTANEOUS
Status: DISCONTINUED | OUTPATIENT
Start: 2017-11-16 | End: 2017-11-18 | Stop reason: HOSPADM

## 2017-11-16 RX ORDER — IBUPROFEN 400 MG/1
400 TABLET, FILM COATED ORAL EVERY 6 HOURS PRN
Status: DISCONTINUED | OUTPATIENT
Start: 2017-11-16 | End: 2017-11-18 | Stop reason: HOSPADM

## 2017-11-16 RX ORDER — IBUPROFEN 600 MG/1
600 TABLET, FILM COATED ORAL EVERY 6 HOURS PRN
Status: DISCONTINUED | OUTPATIENT
Start: 2017-11-16 | End: 2017-11-18 | Stop reason: HOSPADM

## 2017-11-16 RX ORDER — AMOXICILLIN 250 MG
1 CAPSULE ORAL 2 TIMES DAILY PRN
Status: DISCONTINUED | OUTPATIENT
Start: 2017-11-16 | End: 2017-11-18 | Stop reason: HOSPADM

## 2017-11-16 RX ORDER — IBUPROFEN 800 MG/1
800 TABLET, FILM COATED ORAL EVERY 6 HOURS PRN
Status: DISCONTINUED | OUTPATIENT
Start: 2017-11-16 | End: 2017-11-18 | Stop reason: HOSPADM

## 2017-11-16 RX ORDER — OXYTOCIN/0.9 % SODIUM CHLORIDE 30/500 ML
100 PLASTIC BAG, INJECTION (ML) INTRAVENOUS CONTINUOUS
Status: DISCONTINUED | OUTPATIENT
Start: 2017-11-16 | End: 2017-11-18 | Stop reason: HOSPADM

## 2017-11-16 RX ORDER — LANOLIN 100 %
OINTMENT (GRAM) TOPICAL
Status: DISCONTINUED | OUTPATIENT
Start: 2017-11-16 | End: 2017-11-18 | Stop reason: HOSPADM

## 2017-11-16 RX ORDER — HYDROCORTISONE 2.5 %
CREAM (GRAM) TOPICAL 3 TIMES DAILY PRN
Status: DISCONTINUED | OUTPATIENT
Start: 2017-11-16 | End: 2017-11-18 | Stop reason: HOSPADM

## 2017-11-16 RX ADMIN — OXYTOCIN-SODIUM CHLORIDE 0.9% IV SOLN 30 UNIT/500ML 340 ML/HR: 30-0.9/5 SOLUTION at 02:04

## 2017-11-16 RX ADMIN — IBUPROFEN 800 MG: 800 TABLET, FILM COATED ORAL at 09:32

## 2017-11-16 NOTE — ANESTHESIA PROCEDURE NOTES
Peripheral nerve/Neuraxial procedure note : epidural catheter  Pre-Procedure  Performed by ARIES ROMERO  Referred by DIONI  Location: OB    Procedure Times:11/15/2017 9:56 PM and 11/15/2017 10:13 PM  Pre-Anesthestic Checklist: patient identified, IV checked, risks and benefits discussed, informed consent, monitors and equipment checked, pre-op evaluation and at physician/surgeon's request    Timeout  Correct Patient: Yes   Correct Procedure: Yes   Correct Site: Yes   Correct Laterality: N/A   Correct Position: Yes   Site Marked: N/A   .   Procedure Documentation    .    Procedure:    Epidural catheter.  Insertion Site:L3-4  (midline approach) Injection technique: LORT saline   Local skin infiltrated with mL of 1% lidocaine.  THOMAS at 6 cm     Patient Prep;mask, sterile gloves, povidone-iodine 7.5% surgical scrub, patient draped.  .  Needle: Touhy needle Needle Gauge: 17.    Needle Length (Inches) 3.5  # of attempts: 1 and  # of redirects:  1 .   Catheter: 19 G . .  Catheter threaded easily  5 cm epidural space.  11 cm at skin.   .    Assessment/Narrative  Paresthesias: No and Yes.  .  .  Aspiration negative for heme or CSF  . Test dose of 3 mL lidocaine 1.5% w/ 1:200,000 epinephrine at 22:09.  Test dose negative for signs of intravascular, subdural or intrathecal injection.

## 2017-11-16 NOTE — L&D DELIVERY NOTE
Suzie Gorman is a 27 year old-year-old  female,  1 para 0 with EDC 2017, who was admitted for MIOL at 39 weeks gestation. Her prenatal care was at the [facility] in [place]. Prenatal course was complicated by velamentous cord insertion with concern of FGR due to lagging AC at 9th percentile (previously normal), persistent right umbilical vein. Vaginal Group B Streptococcus culture was negative. Rh pos. Rubella immune. Hepatitis B non reactive.  The estimated fetal weight was 6.5lb (ultrasound estimate of 6# at 36wks). Davila score of 7. Oxytocin induction was initiated per standard protocol for inadequate labor. SROM on 11/15 at 1230 pm. Patient Did receive an epidural for pain relief.  The patient achieved complete dilation at 2315 and began pushing at 0003. She went on to deliver a (weight pending) female infant at 201 on 17 by  from direct OP presentation. Apgars were 9 at one minute and 9 at five minutes. There was loose over the right shoulder then under the left arm. The placenta delivered spontaneously and intact, with a 3 vessel cord. Cord noted to be velamentous with marginal insertion. The patient had a 2nd degree perineal laceration(s) that was repaired with 3-0 vicryl.  QBL for the delivery was 557 mL.  Sondra Mars MD

## 2017-11-16 NOTE — PLAN OF CARE
Problem: Patient Care Overview  Goal: Plan of Care/Patient Progress Review  Outcome: Improving  VSS, pain well managed with oral meds. Attentive to infant's needs, bonding well. FOB at bedside, supportive. Meeting expected goals.

## 2017-11-16 NOTE — PROGRESS NOTES
"LABOR NOTE    Subjective: Pushing with decent maternal efforts.    Objective:  /79  Pulse 104  Temp 98.5  F (36.9  C) (Oral)  Resp 16  Ht 1.702 m (5' 7\")  Wt 90.3 kg (199 lb)  BMI 31.17 kg/m2   FHT: 145, mod rose marie, accels, occasional decels with pushing   [place]: q 2-4 min  SVE: anterior ansynclitism    Assessment and Plan:  FHT category 2. Continue maternal efforts. Anticipate .    "

## 2017-11-16 NOTE — PROVIDER NOTIFICATION
11/15/17 9180   Provider Notification   Provider Name/Title Dr. Gunter   Method of Notification Phone   Request Evaluate in Person   Notification Reason Pain   Will come to place epidural

## 2017-11-16 NOTE — ANESTHESIA POSTPROCEDURE EVALUATION
Patient: Suzie Gorman    * No procedures listed *    Diagnosis:* No pre-op diagnosis entered *  Diagnosis Additional Information: Labor pain    Anesthesia Type:  Epidural    Note:  Anesthesia Post Evaluation    Patient location during evaluation: Bedside and Floor       Comments: I or my partner was immediately available for management of this patient during epidural analgesia infusion.   Patient post labor epidural catheter, doing well.  She reports good pain relief with epidural catheter.  She denies ongoing sensorimotor block, headache, fever, chills or other complaints.  All questions answered, understanding voiced.  She will have us contacted for any questions or problems.        Last vitals:  Vitals:    11/16/17 0356 11/16/17 0600 11/16/17 0804   BP: 116/65 108/52 114/56   Pulse:  87    Resp:  18 16   Temp:  98.8  F (37.1  C) 98.5  F (36.9  C)         Electronically Signed By: Jung Mensah MD  November 16, 2017  1:00 PM

## 2017-11-16 NOTE — LACTATION NOTE
Lactation visit. Mom reports baby has NW once but sleepy now. Discussed  nursing behaviors with sleepy baby in the 1st 24 hours. Enc mom to have baby skin to skin and will f/up tomorrow. Note staff reported that throughout the day baby has not latched and mom's smooth nipples are complicating latch. Will most likely try the nipple shield with next feedings to see if baby will latch. Lactation to follow up tomorrow.

## 2017-11-16 NOTE — PROVIDER NOTIFICATION
11/15/17 9868   Provider Notification   Provider Name/Title Dr. Mars   Method of Notification Phone   Request Attend Delivery   Notification Reason Status Update;SVE   will come to hospital.  Begin pushing at 0000

## 2017-11-16 NOTE — PROVIDER NOTIFICATION
11/15/17 2100   Provider Notification   Provider Name/Title Dr. Mars   Method of Notification Phone   Request Evaluate - Remote   Notification Reason Status Update   continue POC

## 2017-11-16 NOTE — PLAN OF CARE
Data: Suzie Gorman transferred to [id number] via wheelchair at 0415. Baby transferred via parent's arms.  Action: Receiving unit notified of transfer: Yes. Patient and family notified of room change. Report given to Zoey CARDOSO RN at 0415. Belongings sent to receiving unit. Accompanied by Registered Nurse. Oriented patient to surroundings. Call light within reach. ID bands double-checked with receiving RN.  Response: Patient tolerated transfer and is stable.

## 2017-11-16 NOTE — PLAN OF CARE
Problem: Patient Care Overview  Goal: Plan of Care/Patient Progress Review  Assumed care at 11am, from RN Lala Diaz. Denies pain. Stable postpartum patient at this time .    Up independent in room, denies pain. Remains stable at this time. FOB present and supportive.

## 2017-11-17 PROCEDURE — 12000027 ZZH R&B OB

## 2017-11-17 PROCEDURE — 40000084 ZZH STATISTIC IP LACTATION SERVICES 16-30 MIN

## 2017-11-17 PROCEDURE — 25000132 ZZH RX MED GY IP 250 OP 250 PS 637: Performed by: OBSTETRICS & GYNECOLOGY

## 2017-11-17 RX ADMIN — IBUPROFEN 800 MG: 800 TABLET, FILM COATED ORAL at 05:38

## 2017-11-17 RX ADMIN — SENNOSIDES AND DOCUSATE SODIUM 1 TABLET: 8.6; 5 TABLET ORAL at 11:18

## 2017-11-17 RX ADMIN — SENNOSIDES AND DOCUSATE SODIUM 1 TABLET: 8.6; 5 TABLET ORAL at 21:27

## 2017-11-17 NOTE — PLAN OF CARE
Problem: Patient Care Overview  Goal: Plan of Care/Patient Progress Review  Outcome: Improving  UP independent in room, plans to shower this afternoon. VSS. Seen by lactation , continues to need help with latching baby especially on left breast, shield given. Patient also plans to pump at home so pumping initiated this afternoon.

## 2017-11-17 NOTE — PROGRESS NOTES
Assisted patient with excellent breastfeed, education and demonstration provided on hand expression, breast compression, and pumping. She was able to pump 7mL on left breast and 3 mL on right.  She watched the hand expression video also and was encouraged to perform after feedings, regardless of pumping.

## 2017-11-17 NOTE — PROGRESS NOTES
"[facility] Postpartum Note    S:  Patient without complaints.  Minimal lochia.    O:  Blood pressure 119/65, pulse 84, temperature 98.2  F (36.8  C), temperature source Oral, resp. rate 18, height 1.702 m (5' 7\"), weight 90.3 kg (199 lb), unknown if currently breastfeeding.        Urine output adequate        Abdomen - Fundus firm, at umbilicus, nontender        Extremities - No calf tenderness    A:   Postpartum Day# 1, s/p Vaginal delivery - doing well    P:  1)  Routine care        2)  Probable D/C tomorrow      Juan Carlos Garcia MD          "

## 2017-11-17 NOTE — LACTATION NOTE
Lactation follow up.  Baby has been better overnight but sleepy this AM. She is still having problems latching to the L and has been nursing the R side. Diagonal tigist noted on the R nipple as if baby has been latching poorly. After trying again on the L with sleepy baby, discussed use of nipple shield and parents agreed. Started with a 20 mm however, mom's nipples are a little bigger and concerned about too tight a fit. After baby sucked awhile, milk noted inside the shield and swallowing noted a little. Tried the larger shield to see if baby would take it and she did nurse a little with the 24 mm. Dr came to examine baby so returned within 1/2 hour to help on the R. Baby was a little too tired but did get a little sucking on the R with the 24 mm shield. Mom's breasts are noted to have a few rene ducts on the upper outer quadrant on the L breast as I assisted with breast compression. Enc parents to continue to do same. Reviewed nipple shield use and care and best time and way to wean from the shield.  Encouraged Mom to call us PRN and plan phone follow up within one week after discharge since going home on a shield.

## 2017-11-17 NOTE — PLAN OF CARE
Problem: Patient Care Overview  Goal: Plan of Care/Patient Progress Review  Outcome: Improving  VSS. Pain managed with PO pharmacological and nonpharmacological methods. Breastfeeding, requires some assistance.  FOB at bedside and supportive in cares.

## 2017-11-17 NOTE — PLAN OF CARE
"Problem: Patient Care Overview  Goal: Plan of Care/Patient Progress Review  Outcome: No Change  VSS, pt has declined pharmacological interventions for pain, having small hemorrhoid, has tucks but declined using them; voiding adequately, breast feeding fairly \"well\" per mother, having flat nipples and talked about shield with pt, declined so far, encouraged pt to call with breast feeding, continue to monitor.      "

## 2017-11-18 VITALS
SYSTOLIC BLOOD PRESSURE: 107 MMHG | WEIGHT: 199 LBS | TEMPERATURE: 97.7 F | DIASTOLIC BLOOD PRESSURE: 70 MMHG | HEIGHT: 67 IN | BODY MASS INDEX: 31.23 KG/M2 | RESPIRATION RATE: 18 BRPM | HEART RATE: 78 BPM

## 2017-11-18 PROCEDURE — 25000132 ZZH RX MED GY IP 250 OP 250 PS 637: Performed by: OBSTETRICS & GYNECOLOGY

## 2017-11-18 PROCEDURE — 40000084 ZZH STATISTIC IP LACTATION SERVICES 16-30 MIN

## 2017-11-18 PROCEDURE — 40000085 ZZH STATISTIC IP LACTATION SERVICES 31-45 MIN

## 2017-11-18 RX ORDER — IBUPROFEN 600 MG/1
600 TABLET, FILM COATED ORAL EVERY 6 HOURS PRN
Qty: 60 TABLET | Refills: 0 | Status: SHIPPED | OUTPATIENT
Start: 2017-11-18 | End: 2018-09-26

## 2017-11-18 RX ORDER — AMOXICILLIN 250 MG
2 CAPSULE ORAL 2 TIMES DAILY PRN
Qty: 60 TABLET | Refills: 0 | Status: SHIPPED | OUTPATIENT
Start: 2017-11-18 | End: 2019-09-09

## 2017-11-18 RX ADMIN — SENNOSIDES AND DOCUSATE SODIUM 2 TABLET: 8.6; 5 TABLET ORAL at 08:12

## 2017-11-18 RX ADMIN — IBUPROFEN 800 MG: 800 TABLET, FILM COATED ORAL at 03:10

## 2017-11-18 NOTE — LACTATION NOTE
Return visit per patient request to assess latch.  latched on right breast in football hold. Patient states  has already nursed for about 10 minutes on other breast. Pain noted by patient with sucking and nipple appeared to be superficially latched. Re-latched deeper and patient's pain was relieved and  began swallowing more frequently. Patient also performing breast compression and using nipple elevation to enhance feeding. She states that her breasts feel less full after  has nursed this time. Beulah unlatched self and sleeping contently after about 20 minutes on second breast. No supplementation of EBM needed since  nursed very well, per pediatrician recommendation.

## 2017-11-18 NOTE — PLAN OF CARE
Problem: Patient Care Overview  Goal: Plan of Care/Patient Progress Review  Outcome: No Change  Independent with self and baby cares. Motrin for discomfort with reported decrease. Monitor.

## 2017-11-18 NOTE — PLAN OF CARE
Problem: Patient Care Overview  Goal: Plan of Care/Patient Progress Review  Outcome: Improving  Pt VSS. Tolerating regular diet. Ambulating. Breastfeeding and bonding well. Using some cream for mild nipple soreness. Declines pain medications. Denies difficulty voiding. FOB is supportive and present.

## 2017-11-18 NOTE — DISCHARGE SUMMARY
The patient had a Vaginal delivery on 11/16/2017 after medical IOL at 39 weeks for velamentous cord insertion and IUGR. Please refer to her delivery summary regarding her delivery. The patient s postpartum course was uncomplicated. She was afebrile throughout her postpartum course.   Her most recent hemoglobin measured   Hemoglobin   Date Value Ref Range Status   11/15/2017 11.1 (L) 11.7 - 15.7 g/dL Final     The patient will return to clinic in 6 weeks for routine follow-up exam.  Discharge instructions were reviewed with the patient.  She is breast feeding and has a script for a breast pump.  The patient was given discharge prescriptions for Motrin and Senokot-S.  Dr. Christina Rodriguez

## 2017-11-18 NOTE — DISCHARGE INSTRUCTIONS
Postpartum Vaginal Delivery Instructions  Lactation [facility]-[phone]  [facility]-[phone]    Activity       Ask family and friends for help when you need it.    Do not place anything in your vagina for 6 weeks.    You are not restricted on other activities, but take it easy for a few weeks to allow your body to recover from delivery.  You are able to do any activities you feel up to that point.    No driving until you have stopped taking your pain medications (usually two weeks after delivery).     Call your health care provider if you have any of these symptoms:       Increased pain, swelling, redness, or fluid around your stiches from an episiotomy or perineal tear.    A fever above 100.4 F (38 C) with or without chills when placing a thermometer under your tongue.    You soak a sanitary pad with blood within 1 hour, or you see blood clots larger than a golf ball.    Bleeding that lasts more than 6 weeks.    Vaginal discharge that smells bad.    Severe pain, cramping or tenderness in your lower belly area.    A need to urinate more frequently (use the toilet more often), more urgently (use the toilet very quickly), or it burns when you urinate.    Nausea and vomiting.    Redness, swelling or pain around a vein in your leg.    Problems breastfeeding or a red or painful area on your breast.    Chest pain and cough or are gasping for air.    Problems coping with sadness, anxiety, or depression.  If you have any concerns about hurting yourself or the baby, call your provider immediately.     You have questions or concerns after you return home.     Keep your hands clean:  Always wash your hands before touching your perineal area and stitches.  This helps reduce your risk of infection.  If your hands aren't dirty, you may use an alcohol hand-rub to clean your hands. Keep your nails clean and short.

## 2017-11-18 NOTE — PLAN OF CARE
Problem: Patient Care Overview  Goal: Plan of Care/Patient Progress Review  Outcome: Adequate for Discharge Date Met: 11/18/17  Data: Vital signs within normal limits. Postpartum checks within normal limits - see flow record. Patient eating and drinking normally. Patient able to empty bladder independently and is up ambulating. No apparent signs of infection. Perineal laceration  healing well. Patient performing self cares and is able to care for infant.  Action: Patient not  medicated during the shift for pain.  - patient stated she was comfortable. Patient education done about . See flow record. All teaching completed, no further questions. Home medications given. ROP papers  given and completed. Seen by DR Rodriguez, discharged to home routine follow up in 6 weeks.  Response: Positive attachment behaviors observed with infant. Support persons FOB  present.   Plan: Anticipate discharge today 11/18, patient plans to stay bed and board as baby on phototherapy and not discharged.     Bed and board at 12.10 .

## 2017-11-19 ENCOUNTER — LACTATION ENCOUNTER (OUTPATIENT)
Age: 27
End: 2017-11-19

## 2017-11-19 NOTE — LACTATION NOTE
"This note was copied from a baby's chart.  Lactation follow up.  gaining weight and bilirubin decreased. Stopped phototherapy and to be discharged today. Mother's breasts are very full, \"rock hard,\" and somewhat painful. She has been using ice and pressure to help decrease pain and swelling. Demonstrated reverse pressure softening prior to latching . Continues to have damage to both nipples, pressure marks from previous poor latching, but no further damage noted. Talmage latched easily after reverse pressure softening and had coordinated sucking with gulps/clicks noted. No shield used. Mother states she occasionally uses latch assist to help jason nipple due to engorgement. Encouraged mother to use lactation number on discharge materials to reach an LC if she has any further questions or issues.  "

## 2017-11-21 ENCOUNTER — TELEPHONE (OUTPATIENT)
Dept: OBGYN | Facility: CLINIC | Age: 27
End: 2017-11-21

## 2018-09-26 ENCOUNTER — HOSPITAL ENCOUNTER (EMERGENCY)
Facility: CLINIC | Age: 28
Discharge: HOME | End: 2018-09-26
Attending: EMERGENCY MEDICINE | Admitting: EMERGENCY MEDICINE
Payer: COMMERCIAL

## 2018-09-26 VITALS
TEMPERATURE: 98.1 F | OXYGEN SATURATION: 98 % | SYSTOLIC BLOOD PRESSURE: 140 MMHG | RESPIRATION RATE: 18 BRPM | DIASTOLIC BLOOD PRESSURE: 94 MMHG

## 2018-09-26 DIAGNOSIS — H65.92 OME (OTITIS MEDIA WITH EFFUSION), LEFT: ICD-10-CM

## 2018-09-26 PROCEDURE — 25000132 ZZH RX MED GY IP 250 OP 250 PS 637: Performed by: EMERGENCY MEDICINE

## 2018-09-26 PROCEDURE — 99283 EMERGENCY DEPT VISIT LOW MDM: CPT

## 2018-09-26 RX ORDER — AMOXICILLIN 875 MG
875 TABLET ORAL 2 TIMES DAILY
Qty: 20 TABLET | Refills: 0 | Status: ON HOLD | OUTPATIENT
Start: 2018-09-26 | End: 2019-02-18

## 2018-09-26 RX ORDER — GUAIFENESIN 600 MG/1
600 TABLET, EXTENDED RELEASE ORAL 2 TIMES DAILY PRN
Qty: 20 TABLET | Refills: 0 | Status: SHIPPED | OUTPATIENT
Start: 2018-09-26 | End: 2019-09-09

## 2018-09-26 RX ORDER — ACETAMINOPHEN 500 MG
1000 TABLET ORAL ONCE
Status: COMPLETED | OUTPATIENT
Start: 2018-09-26 | End: 2018-09-26

## 2018-09-26 RX ORDER — AMOXICILLIN 875 MG
875 TABLET ORAL ONCE
Status: COMPLETED | OUTPATIENT
Start: 2018-09-26 | End: 2018-09-26

## 2018-09-26 RX ADMIN — AMOXICILLIN 875 MG: 875 TABLET, FILM COATED ORAL at 01:27

## 2018-09-26 RX ADMIN — ACETAMINOPHEN 1000 MG: 500 TABLET, FILM COATED ORAL at 01:27

## 2018-09-26 ASSESSMENT — ENCOUNTER SYMPTOMS
FEVER: 0
RHINORRHEA: 1
COUGH: 0
SORE THROAT: 0

## 2018-09-26 NOTE — ED TRIAGE NOTES
Patient presents to ED due to L ear pain. Onset developed this evening. Denies fever    20 weeks pregnant

## 2018-09-26 NOTE — ED PROVIDER NOTES
History     Chief Complaint:  Otitis Media    HPI   Suzie Gorman is a 28 year old female, 20 weeks pregnant, T 1 P 2 A 0 L 1, otherwise healthy who presents with left sided ear pain since yesterday evening. The patient has not yet taken any tylenol symptoms, but reports left sided ear pain for the past night, as well as ongoing sinus congestion, although she denies cough or sore throat. She notes while in the ED that during the 1st trimester of her previous pregnancy she had a double ear infection otherwise does not have an extensive history of ear aches. Due to worsening pain and the inability to sleep, the patient is prompted to the ED. She denies any further acute symptoms at this time such as fever.     Allergies:  No known drug allergies     Medications:    Pre- vitamins    Past Medical History:    Varicella    Past Surgical History:    History reviewed. No pertinent surgical history.    Family History:    History reviewed. No pertinent family history.     Social History:  Smoking Status: Never  Smokeless Tobacco: Never  Alcohol Use: No  Marital Status:  Single      Review of Systems   Constitutional: Negative for fever.   HENT: Positive for congestion, ear pain and rhinorrhea. Negative for sore throat.    Respiratory: Negative for cough.    All other systems reviewed and are negative.    Physical Exam   First Vitals:  BP: (!) 140/94  Heart Rate: 86  Temp: 98.1  F (36.7  C)  Resp: 18  SpO2: 98 %    Physical Exam  General: Patient is alert and interactive when I enter the room  Head:  The scalp, face, and head appear normal  Eyes:  Conjunctivae are normal  ENT:    Right ear appears normal externally, TM is normal.  Left TM is erythematous with evidence of effusion.  Left external ear is normal.  Neck:  Trachea midline  CV:  Normal rate  Resp:  No respiratory distress   Musc:  Normal muscular tone    No major joint effusions    No asymmetric leg swelling    Good capillary refill noted  Skin:  No rash or  lesions noted  Neuro: Speech is normal and fluent. Face is symmetric. Moving all extremities well.   Psych:  Awake. Alert.  Normal affect.  Appropriate interactions.          Emergency Department Course     Interventions:  0127 - Amoxil 875 mg PO  0127 - Tylenol 1,000 mg PO    Emergency Department Course:  Nursing notes and vitals reviewed.    0101: I performed an exam of the patient as documented above.     Findings and plan explained to the Patient. Patient discharged home with instructions regarding supportive care, medications, and reasons to return. The importance of close follow-up was reviewed.     Impression & Plan      Medical Decision Making:  The patient has an exam consistent with acute otitis media.  There is no sign of mastoiditis, mass, dental abscess, or peritonsillar abscess. There is no evidence of otitis externa.  The patient will be started on antibiotics and may take Tylenol for pain. She was also given a prescription for Amoxicillin and Mucinex.  Return if increasing pain, fever, decrease in hearing or ear discharge that persists.  Follow-up with primary physician in 7-10 days, if symptoms persist.    Diagnosis:    ICD-10-CM    1. OME (otitis media with effusion), left H65.92        Disposition:  discharged to home    Discharge Medications:  New Prescriptions    AMOXICILLIN (AMOXIL) 875 MG TABLET    Take 1 tablet (875 mg) by mouth 2 times daily for 10 days    GUAIFENESIN (MUCINEX) 600 MG 12 HR TABLET    Take 1 tablet (600 mg) by mouth 2 times daily as needed for congestion       Tiera Barrera  9/26/2018   [facility] EMERGENCY DEPARTMENT  ITiera am serving as a scribe at 1:01 AM on 9/26/2018 to document services personally performed by Quinton Avalos MD based on my observations and the provider's statements to me.       Quinton Avalos MD  09/28/18 1950

## 2018-09-26 NOTE — ED AVS SNAPSHOT
[facility] Emergency Department    [address] [facility] [address]    Phone:  [phone]    Fax:  [phone]                                       Suzie Gorman   MRN: [id number]    Department:  [facility] Emergency Department   Date of Visit:  9/26/2018           Patient Information     Date Of Birth          1990        Your diagnoses for this visit were:     OME (otitis media with effusion), left        You were seen by Quinton Avalos MD.      Follow-up Information     Follow up with Darren Rubio.    Specialty:  OB/Gyn    Why:  5-7 days for ear recheck     Contact information:    [facility]  [address] [facility] [address]  [phone]          Follow up with [facility] Emergency Department.    Specialty:  EMERGENCY MEDICINE    Why:  As needed, If symptoms worsen    Contact information:    [address] [facility] [address] [phone]        Discharge Instructions         May take tylenol for pain relief, 1,000mg every 6 hours. This is safe in pregnancy.   A decongestant will likely help as well.   Discuss with your obgyn use of ibuprofen in your 2nd trimester.   Middle Ear Infection (Adult)  You have an infection of the middle ear, the space behind the eardrum. This is also called acute otitis media (AOM). Sometimes it is caused by the common cold. This is because congestion can block the internal passage (eustachian tube) that drains fluid from the middle ear. When the middle ear fills with fluid, bacteria can grow there and cause an infection. Oral antibiotics are used to treat this illness, not ear drops. Symptoms usually start to improve within 1 to 2 days of treatment.    Home care  The following are general care guidelines:    Finish all of the antibiotic medicine given, even though you may feel better after the first few days.    You may use over-the-counter medicine, such as  acetaminophen or ibuprofen, to control pain and fever, unless something else was prescribed. If you have chronic liver or kidney disease or have ever had a stomach ulcer or gastrointestinal bleeding, talk with your healthcare provider before using these medicines. Do not give aspirin to anyone under 18 years of age who has a fever. It may cause severe illness or death.  Follow-up care  Follow up with your healthcare provider, or as advised, in 2 weeks if all symptoms have not gotten better, or if hearing doesn't go back to normal within 1 month.  When to seek medical advice  Call your healthcare provider right away if any of these occur:    Ear pain gets worse or does not improve after 3 days of treatment    Unusual drowsiness or confusion    Neck pain, stiff neck, or headache    Fluid or blood draining from the ear canal    Fever of 100.4 F (38 C) or as advised     Seizure  Date Last Reviewed: 6/1/2016 2000-2017 [employer]. [address] [place] [address]. All rights reserved. This information is not intended as a substitute for professional medical care. Always follow your healthcare professional's instructions.          24 Hour Appointment Hotline       To make an appointment at any [facility] clinic, call [phone] ([phone]). If you don't have a family doctor or clinic, we will help you find one. [facility] clinics are conveniently located to serve the needs of you and your family.             Review of your medicines      START taking        Dose / Directions Last dose taken    amoxicillin 875 MG tablet   Commonly known as:  AMOXIL   Dose:  875 mg   Quantity:  20 tablet        Take 1 tablet (875 mg) by mouth 2 times daily for 10 days   Refills:  0        guaiFENesin 600 MG 12 hr tablet   Commonly known as:  MUCINEX   Dose:  600 mg   Quantity:  20 tablet        Take 1 tablet (600 mg) by mouth 2 times daily as needed for congestion   Refills:  0          Our records show that  you are taking the medicines listed below. If these are incorrect, please call your family doctor or clinic.        Dose / Directions Last dose taken    prenatal multivitamin plus iron 27-0.8 MG Tabs per tablet   Dose:  1 tablet        Take 1 tablet by mouth daily   Refills:  0        senna-docusate 8.6-50 MG per tablet   Commonly known as:  SENOKOT-S;PERICOLACE   Dose:  2 tablet   Quantity:  60 tablet        Take 2 tablets by mouth 2 times daily as needed for constipation   Refills:  0          STOP taking        Dose Reason for stopping Comments    ibuprofen 600 MG tablet   Commonly known as:  ADVIL/MOTRIN                      Prescriptions were sent or printed at these locations (2 Prescriptions)                   Other Prescriptions                Printed at Department/Unit printer (2 of 2)         amoxicillin (AMOXIL) 875 MG tablet               guaiFENesin (MUCINEX) 600 MG 12 hr tablet                Orders Needing Specimen Collection     None      Pending Results     No orders found from 9/24/2018 to 9/27/2018.            Pending Culture Results     No orders found from 9/24/2018 to 9/27/2018.            Pending Results Instructions     If you had any lab results that were not finalized at the time of your Discharge, you can call the ED Lab Result RN at [phone]. You will be contacted by this team for any positive Lab results or changes in treatment. The nurses are available 7 days a week from 10A to 6:30P.  You can leave a message 24 hours per day and they will return your call.        Test Results From Your Hospital Stay               Clinical Quality Measure: Blood Pressure Screening     Your blood pressure was checked while you were in the emergency department today. The last reading we obtained was  BP: (!) 140/94 . Please read the guidelines below about what these numbers mean and what you should do about them.  If your systolic blood pressure (the top number) is less than 120 and your diastolic  "blood pressure (the bottom number) is less than 80, then your blood pressure is normal. There is nothing more that you need to do about it.  If your systolic blood pressure (the top number) is 120-139 or your diastolic blood pressure (the bottom number) is 80-89, your blood pressure may be higher than it should be. You should have your blood pressure rechecked within a year by a primary care provider.  If your systolic blood pressure (the top number) is 140 or greater or your diastolic blood pressure (the bottom number) is 90 or greater, you may have high blood pressure. High blood pressure is treatable, but if left untreated over time it can put you at risk for heart attack, stroke, or kidney failure. You should have your blood pressure rechecked by a primary care provider within the next 4 weeks.  If your provider in the emergency department today gave you specific instructions to follow-up with your doctor or provider even sooner than that, you should follow that instruction and not wait for up to 4 weeks for your follow-up visit.        Thank you for choosing [facility]       Thank you for choosing [facility] for your care. Our goal is always to provide you with excellent care. Hearing back from our patients is one way we can continue to improve our services. Please take a few minutes to complete the written survey that you may receive in the mail after you visit with us. Thank you!        [employer] Information     [employer] lets you send messages to your doctor, view your test results, renew your prescriptions, schedule appointments and more. To sign up, go to [email or web address] . Click on \"Log in\" on the left side of the screen, which will take you to the Welcome page. Then click on \"Sign up Now\" on the right side of the page.     You will be asked to enter the access code listed below, as well as some personal information. Please follow the directions to create your username and password.     Your access code " is: [email or web address]  Expires: 2018  1:28 AM     Your access code will  in 90 days. If you need help or a new code, please call your [facility] clinic or [phone].        Care EveryWhere ID     This is your Care EveryWhere ID. This could be used by other organizations to access your [facility] medical records  [id number]        Equal Access to Services     MARIA ISABEL TY : Hadii brgiht mcknighto Soayeshaali, waaxda luqadaha, qaybta kaalmada adeegyada, noelle crowley . So [facility] [phone].    ATENCIÓN: Si habla melchor, tiene a arenas disposición servicios gratuitos de asistencia lingüística. Llame al [phone].    We comply with applicable federal civil rights laws and [place] laws. We do not discriminate on the basis of race, color, national origin, age, disability, sex, sexual orientation, or gender identity.            After Visit Summary       This is your record. Keep this with you and show to your community pharmacist(s) and doctor(s) at your next visit.

## 2018-09-26 NOTE — ED AVS SNAPSHOT
[facility] Emergency Department    [address]    [facility] [address]    Phone:  [phone]    Fax:  [phone]                                       Suzie Gorman   MRN: [id number]    Department:  [facility] Emergency Department   Date of Visit:  9/26/2018           After Visit Summary Signature Page     I have received my discharge instructions, and my questions have been answered. I have discussed any challenges I see with this plan with the nurse or doctor.    ..........................................................................................................................................  Patient/Patient Representative Signature      ..........................................................................................................................................  Patient Representative Print Name and Relationship to Patient    ..................................................               ................................................  Date                                   Time    ..........................................................................................................................................  Reviewed by Signature/Title    ...................................................              ..............................................  Date                                               Time          22EPIC Rev 08/18

## 2018-09-26 NOTE — DISCHARGE INSTRUCTIONS
May take tylenol for pain relief, 1,000mg every 6 hours. This is safe in pregnancy.   A decongestant will likely help as well.   Discuss with your obgyn use of ibuprofen in your 2nd trimester.   Middle Ear Infection (Adult)  You have an infection of the middle ear, the space behind the eardrum. This is also called acute otitis media (AOM). Sometimes it is caused by the common cold. This is because congestion can block the internal passage (eustachian tube) that drains fluid from the middle ear. When the middle ear fills with fluid, bacteria can grow there and cause an infection. Oral antibiotics are used to treat this illness, not ear drops. Symptoms usually start to improve within 1 to 2 days of treatment.    Home care  The following are general care guidelines:    Finish all of the antibiotic medicine given, even though you may feel better after the first few days.    You may use over-the-counter medicine, such as acetaminophen or ibuprofen, to control pain and fever, unless something else was prescribed. If you have chronic liver or kidney disease or have ever had a stomach ulcer or gastrointestinal bleeding, talk with your healthcare provider before using these medicines. Do not give aspirin to anyone under 18 years of age who has a fever. It may cause severe illness or death.  Follow-up care  Follow up with your healthcare provider, or as advised, in 2 weeks if all symptoms have not gotten better, or if hearing doesn't go back to normal within 1 month.  When to seek medical advice  Call your healthcare provider right away if any of these occur:    Ear pain gets worse or does not improve after 3 days of treatment    Unusual drowsiness or confusion    Neck pain, stiff neck, or headache    Fluid or blood draining from the ear canal    Fever of 100.4 F (38 C) or as advised     Seizure  Date Last Reviewed: 6/1/2016 2000-2017 [employer]. [address] [place] [address], [place] [address]. All rights  reserved. This information is not intended as a substitute for professional medical care. Always follow your healthcare professional's instructions.

## 2019-02-16 ENCOUNTER — ANESTHESIA EVENT (OUTPATIENT)
Dept: OBGYN | Facility: CLINIC | Age: 29
End: 2019-02-16
Payer: COMMERCIAL

## 2019-02-16 ENCOUNTER — HOSPITAL ENCOUNTER (INPATIENT)
Facility: CLINIC | Age: 29
LOS: 2 days | Discharge: HOME | End: 2019-02-18
Attending: OBSTETRICS & GYNECOLOGY | Admitting: OBSTETRICS & GYNECOLOGY
Payer: COMMERCIAL

## 2019-02-16 ENCOUNTER — ANESTHESIA (OUTPATIENT)
Dept: OBGYN | Facility: CLINIC | Age: 29
End: 2019-02-16
Payer: COMMERCIAL

## 2019-02-16 PROBLEM — O09.90 SUPERVISION OF HIGH-RISK PREGNANCY: Status: ACTIVE | Noted: 2019-02-16

## 2019-02-16 PROBLEM — O35.13X0: Status: ACTIVE | Noted: 2019-02-16

## 2019-02-16 PROBLEM — O09.90 HIGH-RISK PREGNANCY, UNSPECIFIED TRIMESTER: Status: ACTIVE | Noted: 2019-02-16

## 2019-02-16 LAB
ABO + RH BLD: NORMAL
ABO + RH BLD: NORMAL
SPECIMEN EXP DATE BLD: NORMAL

## 2019-02-16 PROCEDURE — 0064U ANTB TP TOTAL&RPR IA QUAL: CPT | Performed by: OBSTETRICS & GYNECOLOGY

## 2019-02-16 PROCEDURE — 25800030 ZZH RX IP 258 OP 636: Performed by: ANESTHESIOLOGY

## 2019-02-16 PROCEDURE — 37000011 ZZH ANESTHESIA WARD SERVICE

## 2019-02-16 PROCEDURE — 40000671 ZZH STATISTIC ANESTHESIA CASE

## 2019-02-16 PROCEDURE — 86900 BLOOD TYPING SEROLOGIC ABO: CPT | Performed by: OBSTETRICS & GYNECOLOGY

## 2019-02-16 PROCEDURE — 27110038 ZZH RX 271: Performed by: ANESTHESIOLOGY

## 2019-02-16 PROCEDURE — 86901 BLOOD TYPING SEROLOGIC RH(D): CPT | Performed by: OBSTETRICS & GYNECOLOGY

## 2019-02-16 PROCEDURE — 0KQM0ZZ REPAIR PERINEUM MUSCLE, OPEN APPROACH: ICD-10-PCS | Performed by: OBSTETRICS & GYNECOLOGY

## 2019-02-16 PROCEDURE — 72200001 ZZH LABOR CARE VAGINAL DELIVERY SINGLE

## 2019-02-16 PROCEDURE — 00HU33Z INSERTION OF INFUSION DEVICE INTO SPINAL CANAL, PERCUTANEOUS APPROACH: ICD-10-PCS | Performed by: ANESTHESIOLOGY

## 2019-02-16 PROCEDURE — 25800030 ZZH RX IP 258 OP 636: Performed by: OBSTETRICS & GYNECOLOGY

## 2019-02-16 PROCEDURE — 12000000 ZZH R&B MED SURG/OB

## 2019-02-16 PROCEDURE — 10907ZC DRAINAGE OF AMNIOTIC FLUID, THERAPEUTIC FROM PRODUCTS OF CONCEPTION, VIA NATURAL OR ARTIFICIAL OPENING: ICD-10-PCS | Performed by: OBSTETRICS & GYNECOLOGY

## 2019-02-16 PROCEDURE — 25000125 ZZHC RX 250: Performed by: OBSTETRICS & GYNECOLOGY

## 2019-02-16 PROCEDURE — 25000128 H RX IP 250 OP 636: Performed by: OBSTETRICS & GYNECOLOGY

## 2019-02-16 PROCEDURE — 36415 COLL VENOUS BLD VENIPUNCTURE: CPT | Performed by: OBSTETRICS & GYNECOLOGY

## 2019-02-16 PROCEDURE — 3E0R3BZ INTRODUCTION OF ANESTHETIC AGENT INTO SPINAL CANAL, PERCUTANEOUS APPROACH: ICD-10-PCS | Performed by: ANESTHESIOLOGY

## 2019-02-16 PROCEDURE — 25000128 H RX IP 250 OP 636: Performed by: ANESTHESIOLOGY

## 2019-02-16 RX ORDER — AMOXICILLIN 250 MG
2 CAPSULE ORAL 2 TIMES DAILY
Status: DISCONTINUED | OUTPATIENT
Start: 2019-02-16 | End: 2019-02-18 | Stop reason: HOSPADM

## 2019-02-16 RX ORDER — HYDROCORTISONE 2.5 %
CREAM (GRAM) TOPICAL 3 TIMES DAILY PRN
Status: DISCONTINUED | OUTPATIENT
Start: 2019-02-16 | End: 2019-02-18 | Stop reason: HOSPADM

## 2019-02-16 RX ORDER — ONDANSETRON 2 MG/ML
4 INJECTION INTRAMUSCULAR; INTRAVENOUS EVERY 6 HOURS PRN
Status: DISCONTINUED | OUTPATIENT
Start: 2019-02-16 | End: 2019-02-16

## 2019-02-16 RX ORDER — ACETAMINOPHEN 325 MG/1
650 TABLET ORAL EVERY 4 HOURS PRN
Status: DISCONTINUED | OUTPATIENT
Start: 2019-02-16 | End: 2019-02-18 | Stop reason: HOSPADM

## 2019-02-16 RX ORDER — ONDANSETRON 2 MG/ML
4 INJECTION INTRAMUSCULAR; INTRAVENOUS EVERY 6 HOURS PRN
Status: DISCONTINUED | OUTPATIENT
Start: 2019-02-16 | End: 2019-02-18 | Stop reason: HOSPADM

## 2019-02-16 RX ORDER — OXYTOCIN/0.9 % SODIUM CHLORIDE 30/500 ML
340 PLASTIC BAG, INJECTION (ML) INTRAVENOUS CONTINUOUS PRN
Status: DISCONTINUED | OUTPATIENT
Start: 2019-02-16 | End: 2019-02-18 | Stop reason: HOSPADM

## 2019-02-16 RX ORDER — IBUPROFEN 800 MG/1
800 TABLET, FILM COATED ORAL EVERY 6 HOURS PRN
Status: DISCONTINUED | OUTPATIENT
Start: 2019-02-16 | End: 2019-02-18 | Stop reason: HOSPADM

## 2019-02-16 RX ORDER — OXYTOCIN 10 [USP'U]/ML
10 INJECTION, SOLUTION INTRAMUSCULAR; INTRAVENOUS
Status: DISCONTINUED | OUTPATIENT
Start: 2019-02-16 | End: 2019-02-16

## 2019-02-16 RX ORDER — LANOLIN 100 %
OINTMENT (GRAM) TOPICAL
Status: DISCONTINUED | OUTPATIENT
Start: 2019-02-16 | End: 2019-02-18 | Stop reason: HOSPADM

## 2019-02-16 RX ORDER — CARBOPROST TROMETHAMINE 250 UG/ML
250 INJECTION, SOLUTION INTRAMUSCULAR
Status: DISCONTINUED | OUTPATIENT
Start: 2019-02-16 | End: 2019-02-16

## 2019-02-16 RX ORDER — METHYLERGONOVINE MALEATE 0.2 MG/ML
200 INJECTION INTRAVENOUS
Status: DISCONTINUED | OUTPATIENT
Start: 2019-02-16 | End: 2019-02-16

## 2019-02-16 RX ORDER — ONDANSETRON 4 MG/1
4 TABLET, ORALLY DISINTEGRATING ORAL EVERY 6 HOURS PRN
Status: DISCONTINUED | OUTPATIENT
Start: 2019-02-16 | End: 2019-02-16

## 2019-02-16 RX ORDER — PRENATAL VIT/IRON FUM/FOLIC AC 27MG-0.8MG
1 TABLET ORAL DAILY
Status: DISCONTINUED | OUTPATIENT
Start: 2019-02-16 | End: 2019-02-18 | Stop reason: HOSPADM

## 2019-02-16 RX ORDER — NALOXONE HYDROCHLORIDE 0.4 MG/ML
.1-.4 INJECTION, SOLUTION INTRAMUSCULAR; INTRAVENOUS; SUBCUTANEOUS
Status: DISCONTINUED | OUTPATIENT
Start: 2019-02-16 | End: 2019-02-16

## 2019-02-16 RX ORDER — SODIUM CHLORIDE, SODIUM LACTATE, POTASSIUM CHLORIDE, CALCIUM CHLORIDE 600; 310; 30; 20 MG/100ML; MG/100ML; MG/100ML; MG/100ML
INJECTION, SOLUTION INTRAVENOUS CONTINUOUS
Status: DISCONTINUED | OUTPATIENT
Start: 2019-02-16 | End: 2019-02-16

## 2019-02-16 RX ORDER — NALOXONE HYDROCHLORIDE 0.4 MG/ML
.1-.4 INJECTION, SOLUTION INTRAMUSCULAR; INTRAVENOUS; SUBCUTANEOUS
Status: DISCONTINUED | OUTPATIENT
Start: 2019-02-16 | End: 2019-02-18 | Stop reason: HOSPADM

## 2019-02-16 RX ORDER — IBUPROFEN 800 MG/1
800 TABLET, FILM COATED ORAL
Status: DISCONTINUED | OUTPATIENT
Start: 2019-02-16 | End: 2019-02-16

## 2019-02-16 RX ORDER — EPHEDRINE SULFATE 50 MG/ML
5 INJECTION, SOLUTION INTRAMUSCULAR; INTRAVENOUS; SUBCUTANEOUS
Status: DISCONTINUED | OUTPATIENT
Start: 2019-02-16 | End: 2019-02-16

## 2019-02-16 RX ORDER — OXYCODONE AND ACETAMINOPHEN 5; 325 MG/1; MG/1
1 TABLET ORAL
Status: DISCONTINUED | OUTPATIENT
Start: 2019-02-16 | End: 2019-02-16

## 2019-02-16 RX ORDER — ACETAMINOPHEN 325 MG/1
650 TABLET ORAL EVERY 4 HOURS PRN
Status: DISCONTINUED | OUTPATIENT
Start: 2019-02-16 | End: 2019-02-16

## 2019-02-16 RX ORDER — NALBUPHINE HYDROCHLORIDE 10 MG/ML
2.5-5 INJECTION, SOLUTION INTRAMUSCULAR; INTRAVENOUS; SUBCUTANEOUS EVERY 6 HOURS PRN
Status: DISCONTINUED | OUTPATIENT
Start: 2019-02-16 | End: 2019-02-16

## 2019-02-16 RX ORDER — BISACODYL 10 MG
10 SUPPOSITORY, RECTAL RECTAL DAILY PRN
Status: DISCONTINUED | OUTPATIENT
Start: 2019-02-18 | End: 2019-02-18 | Stop reason: HOSPADM

## 2019-02-16 RX ORDER — MISOPROSTOL 200 UG/1
800 TABLET ORAL
Status: DISCONTINUED | OUTPATIENT
Start: 2019-02-16 | End: 2019-02-18 | Stop reason: HOSPADM

## 2019-02-16 RX ORDER — LIDOCAINE 40 MG/G
CREAM TOPICAL
Status: DISCONTINUED | OUTPATIENT
Start: 2019-02-16 | End: 2019-02-16

## 2019-02-16 RX ORDER — OXYTOCIN/0.9 % SODIUM CHLORIDE 30/500 ML
1-24 PLASTIC BAG, INJECTION (ML) INTRAVENOUS CONTINUOUS
Status: DISCONTINUED | OUTPATIENT
Start: 2019-02-16 | End: 2019-02-16

## 2019-02-16 RX ORDER — FENTANYL CITRATE 50 UG/ML
50-100 INJECTION, SOLUTION INTRAMUSCULAR; INTRAVENOUS
Status: DISCONTINUED | OUTPATIENT
Start: 2019-02-16 | End: 2019-02-16

## 2019-02-16 RX ORDER — OXYTOCIN/0.9 % SODIUM CHLORIDE 30/500 ML
100-340 PLASTIC BAG, INJECTION (ML) INTRAVENOUS CONTINUOUS PRN
Status: COMPLETED | OUTPATIENT
Start: 2019-02-16 | End: 2019-02-16

## 2019-02-16 RX ORDER — BUPIVACAINE HCL/0.9 % NACL/PF 0.125 %
PLASTIC BAG, INJECTION (ML) EPIDURAL CONTINUOUS
Status: DISCONTINUED | OUTPATIENT
Start: 2019-02-16 | End: 2019-02-16

## 2019-02-16 RX ORDER — OXYTOCIN/0.9 % SODIUM CHLORIDE 30/500 ML
100 PLASTIC BAG, INJECTION (ML) INTRAVENOUS CONTINUOUS
Status: DISCONTINUED | OUTPATIENT
Start: 2019-02-16 | End: 2019-02-18 | Stop reason: HOSPADM

## 2019-02-16 RX ORDER — OXYTOCIN 10 [USP'U]/ML
10 INJECTION, SOLUTION INTRAMUSCULAR; INTRAVENOUS
Status: DISCONTINUED | OUTPATIENT
Start: 2019-02-16 | End: 2019-02-18 | Stop reason: HOSPADM

## 2019-02-16 RX ORDER — AMOXICILLIN 250 MG
2 CAPSULE ORAL 2 TIMES DAILY PRN
Status: DISCONTINUED | OUTPATIENT
Start: 2019-02-16 | End: 2019-02-18 | Stop reason: HOSPADM

## 2019-02-16 RX ORDER — GUAIFENESIN 600 MG/1
600 TABLET, EXTENDED RELEASE ORAL 2 TIMES DAILY PRN
Status: DISCONTINUED | OUTPATIENT
Start: 2019-02-16 | End: 2019-02-18 | Stop reason: HOSPADM

## 2019-02-16 RX ORDER — HYDROMORPHONE HYDROCHLORIDE 1 MG/ML
0.5 INJECTION, SOLUTION INTRAMUSCULAR; INTRAVENOUS; SUBCUTANEOUS ONCE
Status: COMPLETED | OUTPATIENT
Start: 2019-02-16 | End: 2019-02-16

## 2019-02-16 RX ORDER — AMOXICILLIN 250 MG
1 CAPSULE ORAL 2 TIMES DAILY
Status: DISCONTINUED | OUTPATIENT
Start: 2019-02-16 | End: 2019-02-18 | Stop reason: HOSPADM

## 2019-02-16 RX ADMIN — ONDANSETRON 4 MG: 2 INJECTION INTRAMUSCULAR; INTRAVENOUS at 22:43

## 2019-02-16 RX ADMIN — Medication: at 16:52

## 2019-02-16 RX ADMIN — SODIUM CHLORIDE, POTASSIUM CHLORIDE, SODIUM LACTATE AND CALCIUM CHLORIDE 1000 ML: 600; 310; 30; 20 INJECTION, SOLUTION INTRAVENOUS at 08:55

## 2019-02-16 RX ADMIN — Medication 0.5 MG: at 16:47

## 2019-02-16 RX ADMIN — OXYTOCIN-SODIUM CHLORIDE 0.9% IV SOLN 30 UNIT/500ML 340 ML/HR: 30-0.9/5 SOLUTION at 20:28

## 2019-02-16 RX ADMIN — OXYTOCIN-SODIUM CHLORIDE 0.9% IV SOLN 30 UNIT/500ML 2 MILLI-UNITS/MIN: 30-0.9/5 SOLUTION at 09:48

## 2019-02-16 RX ADMIN — SODIUM CHLORIDE, POTASSIUM CHLORIDE, SODIUM LACTATE AND CALCIUM CHLORIDE: 600; 310; 30; 20 INJECTION, SOLUTION INTRAVENOUS at 19:31

## 2019-02-16 NOTE — PROGRESS NOTES
[facility]   PROGRESS NOTE     S. Pt states she is doing well overall. States her pain is still very managable and does not want an epidural as of now. However she is afraid she would wait too long to get it and that could be a problem to get it in place . +FM    /70   Temp 97.8  F (36.6  C) (Oral)   Resp 16     Baker ctxs q 2-3 min   bm, mod, a +, d -  SVE 2/80/-2, well applied and AROM done for clear fluid    A/P Suzie Gorman is a 28 year old  at 39w0d here with     1- IOL secondary to NIPT and US findings consistent with T21  - continue pitocin per IOL   - AROM done for clear, pt and baby tolerated well  - FHT Cat I  - continue monitoring  - epidural ok whenever pt gets uncomforable    Dr. Alcides Velázquez  [phone]

## 2019-02-16 NOTE — PLAN OF CARE
NICU consulted for patient for the possible T21 based on ultrasound positive soft markers and other signs of T21 as well as having NIPT positive for T21.

## 2019-02-16 NOTE — ANESTHESIA PROCEDURE NOTES
Peripheral nerve/Neuraxial procedure note : epidural catheter  Pre-Procedure  Performed by Jung Mensah MD  Location: OB, floor    Procedure Times:2/16/2019 4:34 PM and 2/16/2019 4:52 PM  Pre-Anesthestic Checklist: patient identified, IV checked, risks and benefits discussed, informed consent, monitors and equipment checked, pre-op evaluation and at physician/surgeon's request    Timeout  Correct Patient: Yes   Correct Procedure: Yes   Correct Site: Yes   Correct Laterality: N/A   Correct Position: Yes   Site Marked: No   .   Procedure Documentation    .    Procedure:    Epidural catheter.  Insertion Site:L3-4  (midline approach) Injection technique: LORT saline   Local skin infiltrated with 3 mL of 1% lidocaine.  THOMAS at 5 cm     Patient Prep;mask, sterile gloves, povidone-iodine 7.5% surgical scrub, patient draped.  .  Needle: Touhy needle Needle Gauge: 17.    Needle Length (Inches) 3.5  # of attempts: 1 and # of redirects:  .   Catheter: 19 G . .  Catheter threaded easily  5 cm epidural space.  10 cm at skin.   .    Assessment/Narrative  Paresthesias: No.  .  .  Aspiration negative for heme or CSF  . Test dose of 5 mL lidocaine 1.5% w/ 1:200,000 epinephrine at 16:47.  Test dose negative for signs of intravascular, subdural or intrathecal injection. Comments:  I or my partner am immediately available. I or my partner will monitor the patient and supervise nursing care at necessary intervals.    Given 10 ml 0.125% bupivicaine infusion with 0.5 mg hydromorphone.

## 2019-02-16 NOTE — PROVIDER NOTIFICATION
19 0841   Provider Notification   Provider Name/Title Dr. Velázquez   Method of Notification Phone   Request Evaluate - Remote   Notification Reason Patient Arrived   MD updated on patient arrival.  Patient  here for induction of labor.  TORB for intrapartum orders, MD plans to round later this am.

## 2019-02-16 NOTE — H&P
"  2019    Suzie Sams  [id number]            OB Admit History & Physical      Ms. Sams  is here for planned IOL per MFM recommendations secondary to possible T21 based on ultrasound positive soft markers and other signs of T21 as well as having NIPT positive for T21.     She denies feeling any contractions, LOF, VB, abnormal discharge. States +FM    No LMP recorded. Patient is pregnant.   Her Estimated Date of Delivery: Data Unavailable, making her Unknown.      Estimated body mass index is 31.17 kg/m  as calculated from the following:    Height as of 11/15/17: 1.702 m (5' 7\").    Weight as of 11/15/17: 90.3 kg (199 lb).  Her prenatal course has been  complicated by abnormal ultrasound findings consistent with positive soft markers and other US findings suspicious for T21 as follows;  - absent nasal bone  - small stomach  - brachycephaly, short femur and humerus    She unfortunately also has an NIPT that is positive for T21.     See prenatal for labs.  neg GBS, Rubella Immune, RH Positive         She is a 28 year old   Her OB history:   Obstetric History       T1      L1     SAB0   TAB0   Ectopic0   Multiple0   Live Births1       # Outcome Date GA Lbr Piotr/2nd Weight Sex Delivery Anes PTL Lv   2 Current            1 Term 17 39w1d 01:25 / 02:46 3.08 kg (6 lb 12.6 oz) F Vag-Spont EPI N FARZAD      Name: SUMIT SAMS      Apgar1:  9                Apgar5: 9             History reviewed. No pertinent past medical history.     History reviewed. No pertinent surgical history.      No current outpatient medications on file.       Allergies: Patient has no known allergies.      REVIEW OF SYSTEMS:  NEUROLOGIC:  Negative  EYES:  Negative  ENT:  Negative  GI:  Negative  :  Negative  GYN:  Negative  CV:  Negative  PULMONARY:  Negative  MUSCULOSKELETAL:  Negative  PSYCH:  Negative      Social History     Socioeconomic History     Marital status: Single     Spouse name: Not on file "     Number of children: Not on file     Years of education: Not on file     Highest education level: Not on file   Social Needs     Financial resource strain: Not on file     Food insecurity - worry: Not on file     Food insecurity - inability: Not on file     Transportation needs - medical: Not on file     Transportation needs - non-medical: Not on file   Occupational History     Not on file   Tobacco Use     Smoking status: Never Smoker     Smokeless tobacco: Never Used   Substance and Sexual Activity     Alcohol use: No     Drug use: No     Sexual activity: Yes     Partners: Male   Other Topics Concern     Not on file   Social History Narrative     Not on file      No family history on file.      Exam:    Vitals:   /66   Temp 97.4  F (36.3  C) (Oral)   Resp 16   FHT: Reactive    [place]:  ctxs q 2-3 min    Alert Awake in NAD  HEENT grossly normal  Neck: no lymphadenopathy or thryoidomegaly  Lungs CTAB  Back No CVAT  Heart RR  ABD gravid, NABS, soft, NT on exam with NT fundus palpable  Cervix is 2 cm / 60 % effaced at -2 per RN, please see flowsheet  EXT:  no edema, no calf tenderness      Assessment:    1)  IUP at 39 wks GA  2) Trisomy 21 per positive NIPT and ultrasound findings  - no amniocentesis done  3) late prenatal care      Plan:    1)  Admission orders in place  2) prolonged monitoring  3) Peds at time of delivery  4) will need SW consult order after delivery        Esther Velázquez MD  Dept of OB/GYN  February 16, 2019

## 2019-02-16 NOTE — ANESTHESIA PREPROCEDURE EVALUATION
"Anesthesia Pre-Procedure Evaluation    Patient: Suzie Gorman   MRN: [id number] : 1990          Preoperative Diagnosis: * No surgery found *        History reviewed. No pertinent past medical history.  History reviewed. No pertinent surgical history.  Anesthesia Evaluation       history and physical reviewed .             ROS/MED HX    ENT/Pulmonary:  - neg pulmonary ROS     Neurologic:  - neg neurologic ROS     Cardiovascular:  - neg cardiovascular ROS       METS/Exercise Tolerance:     Hematologic:         Musculoskeletal:         GI/Hepatic:  - neg GI/hepatic ROS       Renal/Genitourinary:         Endo:         Psychiatric:         Infectious Disease:         Malignancy:         Other:                     neg OB ROS            Physical Exam      Airway     Dental     Cardiovascular       Pulmonary     Other findings:  at term, in labor, requesting pain  management        Lab Results   Component Value Date    HGB 11.1 (L) 11/15/2017       Preop Vitals  BP Readings from Last 3 Encounters:   19 115/71   18 (!) 140/94   17 107/70    Pulse Readings from Last 3 Encounters:   17 78      Resp Readings from Last 3 Encounters:   19 16   18 18   17 18    SpO2 Readings from Last 3 Encounters:   18 98%      Temp Readings from Last 1 Encounters:   19 97.5  F (36.4  C) (Oral)    Ht Readings from Last 1 Encounters:   11/15/17 1.702 m (5' 7\")      Wt Readings from Last 1 Encounters:   11/15/17 90.3 kg (199 lb)    Estimated body mass index is 31.17 kg/m  as calculated from the following:    Height as of 11/15/17: 1.702 m (5' 7\").    Weight as of 11/15/17: 90.3 kg (199 lb).       Anesthesia Plan      History & Physical Review      ASA Status:  .  OB Epidural Asa: 2       Plan for     Discussed risks of epidural catheter placement, including, but not limited to, bruising/bleeding, infection, pain, failure of epidural medications to relieve pain, dural puncture with " subsequent headache/ need for epidural blood patch and nerve damage.  All questions answered, understanding voiced and she wishes to proceed.      Postoperative Care      Consents  Anesthetic plan, risks, benefits and alternatives discussed with:  Patient..                 Jung Mensah MD                    .

## 2019-02-16 NOTE — PLAN OF CARE
Data: [facility]: 2019  7:36 AM.  Patient here for induction of labor. Patient reports feeling well, no leaking of fluid or contractions.  Patient is a .  Prenatal record reviewed. Pregnancy  has been complicated by positive NIPT for trisomy 21.  Gestational Age 39w0d. VSS. Fetal movement present. Patient denies uterine contractions, leaking of vaginal fluid/rupture of membranes, vaginal bleeding, abdominal pain, pelvic pressure, nausea, vomiting, headache, visual disturbances, epigastric or URQ pain, significant edema. Support person is present.   Action: Verbal consent for EFM. Triage assessment completed. Bill of rights reviewed.  Response: Patient verbalized agreement with plan. Will contact Dr Esther Velázquez with update and further orders..

## 2019-02-17 LAB — T PALLIDUM AB SER QL: NONREACTIVE

## 2019-02-17 PROCEDURE — 25000132 ZZH RX MED GY IP 250 OP 250 PS 637: Performed by: OBSTETRICS & GYNECOLOGY

## 2019-02-17 PROCEDURE — 12000000 ZZH R&B MED SURG/OB

## 2019-02-17 RX ADMIN — PRENATAL VIT W/ FE FUMARATE-FA TAB 27-0.8 MG 1 TABLET: 27-0.8 TAB at 08:50

## 2019-02-17 RX ADMIN — SENNOSIDES AND DOCUSATE SODIUM 1 TABLET: 8.6; 5 TABLET ORAL at 08:50

## 2019-02-17 RX ADMIN — ACETAMINOPHEN 650 MG: 325 TABLET, FILM COATED ORAL at 18:01

## 2019-02-17 RX ADMIN — SENNOSIDES AND DOCUSATE SODIUM 1 TABLET: 8.6; 5 TABLET ORAL at 22:08

## 2019-02-17 NOTE — PLAN OF CARE
Stable patient, vitals and fundal checks are WNL. Pt is breastfeeding with minimal assistance, but is independent with self and infant cares. Pt is voiding and is able to ambulate independently. Using ice and tucks for perineum discomfort, declines pain meds and is doing well.

## 2019-02-17 NOTE — PLAN OF CARE
Data: Suzie Gorman transferred to 446 via wheelchair at 2330. Baby transferred via parent's arms.  Action: Receiving unit notified of transfer: Yes. Patient and family notified of room change. Report given to JOHANA Richards at 2345. Belongings sent to receiving unit. Accompanied by Registered Nurse. Oriented patient to surroundings. Call light within reach. ID bands double-checked with receiving RN.  Response: Patient tolerated transfer and is stable.

## 2019-02-17 NOTE — PROGRESS NOTES
S:  Comfortable with epidural  O:  FHT  120s moderate variability, category I        [place]  q 2-3         SVE  7 cm per RN  A/P:  29 yo  at 39 weeks - IOL for suspected trisomy 21  -FHT reassuring  -comfortable with epidural  -continue current care    Nitza العراقي MD  Ob/Gyn  [facility]  [phone]

## 2019-02-17 NOTE — PROGRESS NOTES
[facility] Obstetrics Post-Partum Progress Note       S: Doing well. Denies significant pain. Ambulating and eating without issues. She was able to void this morning without difficulty. No significant bleeding. Breast feeding and this is going ok.     O:  Vitals:    19 2243 19 2259 19 0300 19 0816   BP: 121/59 109/59 103/56 116/82   Pulse:   64    Resp:   18 16   Temp:   97.6  F (36.4  C) 97.5  F (36.4  C)   TempSrc:   Oral Oral       Gen: Alert, NAD  Cardio: Regular rate  Resp: Non labored breathing  Abdomen: soft, appropriately tender, fundus firmat the umbilicus  Extremities: No edema, non-tender      A: 28 year old  PPD#1 s/p . Pregnancy c/b suspected prenatal diagnosis of T21 with positive soft markers on US and NIPT that was positive. Patient declined amnio. Appearance following discharge is c/w with Trisomy 21.    P:  Heme: VSS. No symptoms  GI: Regular diet, bowel regimen  : Voiding without difficulty  Contraception: Discuss prior to discharge  Feeding: Breast  Rubella: Immune  Rh: Positive  Dispo: discharge home tomorow Ashley Hieronimus, MD [facility]  2019 10:00 AM

## 2019-02-17 NOTE — PROVIDER NOTIFICATION
02/16/19 1800   Provider Notification   Provider Name/Title Dr. Velázquez   Method of Notification At Bedside   Request Evaluate - Remote   Notification Reason SVE   Dr. Velázquez updated on SVE of 5/80/-1.  Update with any significant changes.  Dr. العراقي will take over at 1900.

## 2019-02-17 NOTE — PROVIDER NOTIFICATION
02/16/19 1922   Provider Notification   Provider Name/Title Dr. العراقي   Method of Notification At Bedside   Dr. العراقي at bedside to evaluate FHR. SVE 9.5/100/0. FSE placed by MD. MD remains in department.

## 2019-02-17 NOTE — PLAN OF CARE
Patient up ad crow, voiding without difficulty. Denies experiencing pain, declines offers of pain medications. Much support present throughout shift. Bonding well with infant, independent with self cares.

## 2019-02-17 NOTE — L&D DELIVERY NOTE
OB Vaginal Delivery Note    Suzie Gorman MRN# [id number]   Age: 28 year old YOB: 1990       GA: 39w0d  GP:   Labor Complications: None   EBL: 227  mL  QBL: 227 mL  Delivery Type: Vaginal, Spontaneous   ROM to Delivery Time: 5h 15m   Weight:      1 Minute 5 Minute 10 Minute   Apgar Totals: 8    9                Delivery Details:  Suzie Gorman, a 28 year old  female,  presented to [facility] on 2019 for planned IOL at 39 weeks per [facility] recommendations secondary to possible T21 based on ultrasound positive soft markers and other signs of T21 as well as having NIPT positive for T21.     Her induction was started with pitocin.  AROM was then performed for clear fluid.  Her labor progressed normally.  She then had a normal spontaneous vaginal delivery of a viable male infant at 20:21 with apgars of 8   and 9  .  Infant delivered in vertex  right  occiput  anterior  position. Anterior and posterior shoulders delivered without difficulty. The baby was placed on the maternal abdomen.  The cord was clamped x 2, and cut by the father of the baby.  3 vessels  were noted. A segment of cord was isolated for cord gases.   Cord blood was obtained in routine fashion with the following disposition: lab .    The placenta then delivered spontaneously intact.  A second degree laceration was noted and repaired with 3-0 vicryl in a routine fashion.  The mother tolerated the delivery and repair well.  Mother and baby remain in the delivery room in stable condition.    Cord complications: none   Placenta delivered at 2019  8:25 PM . Placental disposition was Hospital disposal . Fundal massage performed and fundus found to be firm.     Episiotomy: none    Perineum, vagina, cervix were inspected, and the following lacerations were noted:   Perineal lacerations: 2nd                     Any lacerations were repaired in the usual fashion using 3-0 vicryl.    Excellent hemostasis was  noted. Needle count correct. Infant and patient in delivery room in good and stable condition.        Labor Event Times    Start pushing date/time:  2019      Labor Events     labor?:  No  Labor Type:  Induction     Antibiotics received during labor?:  No     Rupture date/time: 19 1506   Rupture type:  Artificial Rupture of Membranes  Fluid color:  Clear  Fluid odor:  Normal     Induction:  Oxytocin  Induction date/time:     Cervical ripening date/time:        1:1 continuous labor support provided by?:  RN       Delivery/Placenta Date and Time    Delivery Date:  19 Delivery Time:   8:21 PM   Placenta Date/Time:  2019  8:25 PM  Oxytocin given at the time of delivery:  after delivery of baby     Vaginal Counts     Initial count performed by 2 team members:   Two Team Members   Dr. Malcom LOZANO RN       Needles Suture [place] Sponges Instruments   Initial counts 2  5    Added to count  2     Final counts 2 2 5    Placed during labor Accounted for at the end of labor   Yes Yes   No NA   No NA    Final count performed by 2 team members:   Two Team Members   Dr. Malcom Santana RN      Final count correct?:  Yes     Apgars    Living status:  Living   1 Minute 5 Minute 10 Minute 15 Minute 20 Minute   Skin color: 0  1       Heart rate: 2  2       Reflex irritability: 2  2       Muscle tone: 2  2       Respiratory effort: 2  2       Total: 8  9       Apgars assigned by:  DIMITRIS GALLAGHER RN     Cord    Vessels:  3 Vessels Complications:  None   Cord Blood Disposition:  Lab Gases Sent?:  No       Resuscitation    Methods:  None     Skin to Skin and Feeding Plan    Skin to skin initiation date/time:     Skin to skin end date/time:     How do you plan to feed your baby:  Breastfeeding     Labor Events and Shoulder Dystocia    Fetal Tracing Prior to Delivery:  Category 2  Fetal Tracing Comments:  fetal baseline 100-105 with moderate variability, some early  decelerations  Shoulder dystocia present?:  Neg     Delivery (Maternal) (Provider to Complete) (790322)    Episiotomy:  None  Perineal lacerations:  2nd    Vaginal laceration?:  No    Cervical laceration?:  No    Est. blood loss (mL):  227     Blood Loss  Mother: Suzie Gorman #[id number]   Start of Mother's Information    IO Blood Loss  02/16/19 0821 - 02/16/19 2047    EBL (mL) Hospital Encounter 227 mL    Total QBL Blood Loss (mL) Hospital Encounter 227 mL    Total  454 mL         End of Mother's Information  Mother: Suzie Gorman #[id number]         Delivery - Provider to Complete ([id number])    Delivering clinician:  Nitza العراقي MD  Attempted Delivery Types (Choose all that apply):  Spontaneous Vaginal Delivery  Delivery Type (Choose the 1 that will go to the Birth History):  Vaginal, Spontaneous          Placenta    Delayed Cord Clamping:  Done  Date/Time:  2/16/2019  8:25 PM  Removal:  Spontaneous  Disposition:  Hospital disposal     Anesthesia    Method:  Epidural          Presentation and Position    Presentation:  Vertex  Position:  Right Occiput Anterior           Nitza العراقي MD

## 2019-02-18 VITALS
RESPIRATION RATE: 16 BRPM | DIASTOLIC BLOOD PRESSURE: 74 MMHG | TEMPERATURE: 97.4 F | SYSTOLIC BLOOD PRESSURE: 106 MMHG | HEART RATE: 80 BPM
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
## 2019-02-18 PROBLEM — O09.90 SUPERVISION OF HIGH-RISK PREGNANCY: Status: RESOLVED | Noted: 2019-02-16 | Resolved: 2019-02-18

## 2019-02-18 PROBLEM — Z36.89 ENCOUNTER FOR TRIAGE IN PREGNANT PATIENT: Status: RESOLVED | Noted: 2017-11-13 | Resolved: 2019-02-18

## 2019-02-18 PROCEDURE — 25000132 ZZH RX MED GY IP 250 OP 250 PS 637: Performed by: OBSTETRICS & GYNECOLOGY

## 2019-02-18 PROCEDURE — 40000085 ZZH STATISTIC IP LACTATION SERVICES 31-45 MIN

## 2019-02-18 RX ORDER — BREAST PUMP
1 EACH MISCELLANEOUS DAILY PRN
Qty: 1 EACH | Refills: 0 | Status: SHIPPED | OUTPATIENT
Start: 2019-02-18 | End: 2019-09-09

## 2019-02-18 RX ORDER — AMOXICILLIN 250 MG
1 CAPSULE ORAL 2 TIMES DAILY
Qty: 60 TABLET | Refills: 0 | Status: SHIPPED | OUTPATIENT
Start: 2019-02-18 | End: 2019-09-09

## 2019-02-18 RX ORDER — IBUPROFEN 800 MG/1
800 TABLET, FILM COATED ORAL EVERY 6 HOURS PRN
Qty: 30 TABLET | Refills: 0 | Status: SHIPPED | OUTPATIENT
Start: 2019-02-18 | End: 2019-02-18

## 2019-02-18 RX ORDER — ACETAMINOPHEN 325 MG/1
650 TABLET ORAL EVERY 6 HOURS PRN
Qty: 30 TABLET | Refills: 0 | Status: SHIPPED | OUTPATIENT
Start: 2019-02-18 | End: 2019-02-18

## 2019-02-18 RX ORDER — ACETAMINOPHEN 325 MG/1
650 TABLET ORAL EVERY 6 HOURS PRN
Qty: 30 TABLET | Refills: 0 | Status: SHIPPED | OUTPATIENT
Start: 2019-02-18 | End: 2019-09-09

## 2019-02-18 RX ORDER — AMOXICILLIN 250 MG
1 CAPSULE ORAL 2 TIMES DAILY
Qty: 60 TABLET | Refills: 0 | Status: SHIPPED | OUTPATIENT
Start: 2019-02-18 | End: 2019-02-18

## 2019-02-18 RX ORDER — IBUPROFEN 800 MG/1
800 TABLET, FILM COATED ORAL EVERY 6 HOURS PRN
Qty: 30 TABLET | Refills: 0 | Status: SHIPPED | OUTPATIENT
Start: 2019-02-18 | End: 2019-09-09

## 2019-02-18 RX ADMIN — IBUPROFEN 800 MG: 800 TABLET, FILM COATED ORAL at 04:09

## 2019-02-18 RX ADMIN — PRENATAL VIT W/ FE FUMARATE-FA TAB 27-0.8 MG 1 TABLET: 27-0.8 TAB at 09:00

## 2019-02-18 RX ADMIN — ACETAMINOPHEN 650 MG: 325 TABLET, FILM COATED ORAL at 13:37

## 2019-02-18 RX ADMIN — SENNOSIDES AND DOCUSATE SODIUM 1 TABLET: 8.6; 5 TABLET ORAL at 13:38

## 2019-02-18 NOTE — PROGRESS NOTES
[facility]  PPD# 2    Pt doing well. Ambulating, voiding, tolerating PO. Decreased lochia. Pain controlled. Breast feeding and coping well with baby. Requesting a breast pump script. States she is happy about baby overall and does not have questions at this point. She is unsure about which type of birth control she should use but she is considering Mirena IUD.     Vitals:    19 0816 19 1520 19 2355 19 0800   BP: 116/82 102/66 98/63 106/74   Pulse:   72 80   Resp: 16 16 16 16   Temp: 97.5  F (36.4  C) 97.8  F (36.6  C) 98.2  F (36.8  C) 97.4  F (36.3  C)   TempSrc: Oral Oral Oral Oral     Abd soft, nontender, nondistended, FFBU  Ext 1-2+ edema bilat LE, no CT    A/P 28 year old  at 39w0d s/p  PPD# 2, baby with T21.     1) Postpartum   -Continue routine post-partum care.  - rubella immune - no MMR needed  - Rh pos- no Rhogam needed  - full counseling regarding different birth control methods - pt likely will get Mirena IUD at time of her 6 wk PP visit  - pt will need 6 wk PP visit  - Breast pump script provided    -D/c home today.    Dr. Alcides Velázquez  [phone]  2019 9:40 AM

## 2019-02-18 NOTE — DISCHARGE INSTRUCTIONS
"Postpartum Discharge Instructions   CONGRATULATIONS!   ACTIVITY:   - You may ride in a car, but no driving for 1-2 weeks.   - Do not lift anything heavier than your baby for 6 weeks.   - You may slowly go up and down stairs as you feel able.   - Resume other exercises after 6 weeks.   - Rest when your baby is sleeping.   - Call your doctor if you are feeling \"blue\" for more than 2 weeks.   - Call your doctor immediately or go the Emergency Center if you think you might hurt yourself or your baby.     HYGIENE:   - You may take a tub bath or shower.   - Continue using a brendan bottle or sitz bath for comfort or cleanliness.   - No douching or tampon use until after 6 week checkup.     DIET:   - Wait 6 weeks before dieting to lose weight.   - Aim for gradual weight loss through healthy eating habits.   - Take a vitamin daily unless otherwise directed.     BREASTFEEDING:   - Refer to Breastfeeding Guidelines to Lactation or call [phone]    MEDICATIONS:   - Use as directed on prescription.     PAIN MANAGEMENT: as prescribed     Breast Care:   - If not breastfeeding, apply ice packs to your breasts 3 times per day for 15 minutes. Wear a tight bra for at least one week.   - If breastfeeding, nurse often to get relief, pain medication as directed.     Episiotomy:   - Continue use of brendan bottle and sitz bath as directed.   - Use Dermoplast and/or Tucks as directed.      Incision:   - Splint incision when moving and turning.   - Medications as instructed.     SPECIAL INFORMATION:   - No sexual intercourse for 6 weeks. After that, use a barrier contraception until your doctor tells you it is ok to use something different.   - Breastfeeding is not a method of birth control.   - You may have a period while breastfeeding. Your first period may come 4 to 10 weeks after delivery, or later if you are breastfeeding.   - Avoid constipation. Drink plenty of water, eat vegetables and fruits high in natural fiber,  high grain " breads and cereals. You may use a stool softener as necessary.     COMPLICATIONS:   Call you doctor if any of the following occur:   - Continuing bright red vaginal bleeding or clots larger than a lemon.   - Pain or redness in the breasts.   - Fever over 100.4 when temperature is taken by mouth.   - Burning feeling with urination.   - Bad smelling vaginal drainage.   - Incision or episiotomy pulls apart, is red or has drainage.

## 2019-02-18 NOTE — CONSULTS
[facility]  MATERNAL CHILD HEALTH   INITIAL PSYCHOSOCIAL ASSESSMENT     DATA:     Reason for Social Work Consult: Infant with possible diagnosis of Down's Syndrome.    Presenting Information: SW met with Suzie and Travis who are  and reside in [place] with their 15 month old daughter Manny.  Their  son is Johnny and he was born 19. The couple were aware during pregnancy that their  had markers for Trisomy 21.  They had time to research this and feel prepared.    Social Support: The couple have good supports and both extended families are nearby and supportive.    Employment: Suzie plans to return to work at 12 weeks and Travis has 16 week paternity leave.  Suzie's mother is a childcare provider and plans to watch Johnny.     Mental Health History: No Concerns for PPMD, EPDS is not complete    Community Resources//Baby Supplies: The couple are prepared for Johnny at home and are not on WIC.    INTERVENTION:       FREDERICK completed chart review and collaborated with the multidisciplinary team.     Psychosocial Assessment     Introduction to Maternal Child Health  role and scope of practice     Reviewed Hospital and Community Resources, Gave information on [employer]. The couple had    Already received information from Down's Syndrome Association.    SW gave information on PHN and Help Me Grow.    SW gave Social Security Benefit information for children with disabilities. FREDERICK faxed completed initial interview request to [employer].    Assessed Chemical Health History and Current Symptoms     Assessed Mental Health History and Current Symptoms     Identified stressors, barriers and family concerns     Provided support and active empathetic listening and validation.     Provided psychoeducation on  mood and anxiety disorders, assessed for any current symptoms or history    Provided brochure Depression and Anxiety During and after Pregnancy.      ASSESSMENT:     Coping: Very Well    Affect: appropriate with good eye contact, calm and stable.    Motivation/Ability to Access Services: Highly motivated, independent in accessing services,    Assessment of Support System: stable and involved    Level of engagement with SW: Engaged and appropriate.     Family s understanding of baby s medical situation: appropriate understanding  Family and parent/infant interactions: Parents seem supportive of each other and are very attentive to baby.    Assessment of parental risk for PMAD: Low  Identified Barriers:   None at this time     PLAN:     SW Following and available as needed.

## 2019-02-18 NOTE — PLAN OF CARE
Doing well. Denies significant pain and is taking Ibuprofen as needed. Ambulating and voiding without issues. Independent with cares and breastfeeding. Bonding well.

## 2019-02-18 NOTE — DISCHARGE SUMMARY
[facility]    Discharge Summary  Obstetrics    Date of Admission:  2019  Date of Discharge:  2019  Discharging Provider: Esther Velázquez    Discharge Diagnoses   Patient Active Problem List   Diagnosis     Indication for care in labor or delivery     Trisomy 21 of fetus, current pregnancy, single gestation     High-risk pregnancy, unspecified trimester     Single liveborn infant delivered vaginally       History of Present Illness   Suzie Gorman is a 28 year old female now  who presented to L&D @ 39w0d GA. Her pregnancy has been complicated by ultrasound findings and NIPT consistent with T21. Please see her admit H&P for full details of her PMH, PSH, Meds, Allergies and exam on admit.    Hospital Course   The patient had a Induced vaginal delivery secondary to ultrasound findings and NIPT consistent with T21 @ 39w0d, please see her delivery summary for full details.     Her postpartum course was uncomplicated. On postpartum day 2, she was meeting all of her postpartum goals and deemed stable for discharge. She was voiding without difficulty, tolerating a regular diet without nausea and vomiting, her pain was well controlled on oral pain medicines and her lochia was appropriate.    Hgb:   Lab Results   Component Value Date    HGB 11.1 11/15/2017       Lab Results   Component Value Date    RH Pos 2019    and rhogam was not given    Contraception was discussed and will be addressed at her postpartum appointment.    Instructions:  1) Call for temperature greater than 100.4F, foul smelling vaginal discharge, bleeding more than 1 pad per hour for 2 hrs, pain not controlled by oral pain meds, severe constipation or severe nausea or vomiting.  2) She was instructed to follow-up with her primary OB in 6 weeks for a routine postpartum visit  3) She was instructed to continue her PNV on discharge if she wished to breast feed her infant.    Esther Velázquez MD    Discharge Disposition    Discharged to home   Condition at discharge: Satisfactory    Primary Care Physician   Physician No Ref-Primary    Consultations This Hospital Stay   SOCIAL WORK IP CONSULT  ANESTHESIOLOGY IP CONSULT  HOME CARE POST PARTUM/ IP CONSULT  LACTATION IP CONSULT    Discharge Orders   No discharge procedures on file.  Discharge Medications   Current Discharge Medication List      START taking these medications    Details   acetaminophen (TYLENOL) 325 MG tablet Take 2 tablets (650 mg) by mouth every 6 hours as needed for mild pain or fever (greater than or equal to 38  C /100.4  F (oral) or 38.5  C/ 101.4  F (core).)  Qty: 30 tablet, Refills: 0    Associated Diagnoses: Single liveborn infant delivered vaginally      ibuprofen (ADVIL/MOTRIN) 800 MG tablet Take 1 tablet (800 mg) by mouth every 6 hours as needed (cramping)  Qty: 30 tablet, Refills: 0    Associated Diagnoses: Single liveborn infant delivered vaginally      Misc. Devices (BREAST PUMP) MISC 1 each daily as needed (difficult latching)  Qty: 1 each, Refills: 0    Associated Diagnoses: Single liveborn infant delivered vaginally      !! senna-docusate (SENOKOT-S/PERICOLACE) 8.6-50 MG tablet Take 1 tablet by mouth 2 times daily  Qty: 60 tablet, Refills: 0    Associated Diagnoses: Single liveborn infant delivered vaginally       !! - Potential duplicate medications found. Please discuss with provider.      CONTINUE these medications which have NOT CHANGED    Details   Prenatal Vit-Fe Fumarate-FA (PRENATAL MULTIVITAMIN PLUS IRON) 27-0.8 MG TABS per tablet Take 1 tablet by mouth daily      !! senna-docusate (SENOKOT-S;PERICOLACE) 8.6-50 MG per tablet Take 2 tablets by mouth 2 times daily as needed for constipation  Qty: 60 tablet, Refills: 0    Associated Diagnoses:  (spontaneous vaginal delivery)      guaiFENesin (MUCINEX) 600 MG 12 hr tablet Take 1 tablet (600 mg) by mouth 2 times daily as needed for congestion  Qty: 20 tablet, Refills: 0       !! -  Potential duplicate medications found. Please discuss with provider.      STOP taking these medications       amoxicillin (AMOXIL) 875 MG tablet Comments:   Reason for Stopping:             Allergies   No Known Allergies

## 2019-02-18 NOTE — LACTATION NOTE
"Lactation visit. Ped just finishing visit with parents. Baby is still not verified as Trisomy 21. He has good muscle tone and can Nw. Sleepy at this time but normally does nurse quite well. Nipples are a little smooth but he can mostly latch well per mom. They discussed waiting for circ until the office visit to allow more time to establish good feedings. Parents are very loving and accepting of whatever the outcome of the genetic testing and say, \"either way we will love him the same\". Encouraged mom to do some pumping at home just in case baby tires or has issues of muscle tone weakness until breastfeeding is well established. He has lost 2.9% of birthweight only. Mom reports she has not needed to use the nipple shield but will take one home in case the nipples really flatter more with engorgement. Will have lactation f/up since shield was started and to check progress on feedings since he has pending results of Trisomy 21.  "

## 2019-02-18 NOTE — PLAN OF CARE
Patient verbalizes understanding of discharge instructions. Patient has received Rx's for breast pump to fill at own place of choice, Ibuprofen, Tylenol, and Senna. Patient is to return to clinic for postpartum check at 6 weeks. Lactation consultant has seen patient today prior to discharge. Patient is doing well and is following pathway as expected.  has seen patient today regarding baby and following up for any needs this family has for baby having markers for Trisomy 21. Breastfeeding going well.

## 2019-02-20 ENCOUNTER — TELEPHONE (OUTPATIENT)
Dept: OBGYN | Facility: CLINIC | Age: 29
End: 2019-02-20

## 2019-02-21 ENCOUNTER — TELEPHONE (OUTPATIENT)
Dept: OBGYN | Facility: CLINIC | Age: 29
End: 2019-02-21

## 2019-09-09 ENCOUNTER — OFFICE VISIT (OUTPATIENT)
Dept: URGENT CARE | Facility: URGENT CARE | Age: 29
End: 2019-09-09
Payer: COMMERCIAL

## 2019-09-09 VITALS
SYSTOLIC BLOOD PRESSURE: 108 MMHG | DIASTOLIC BLOOD PRESSURE: 62 MMHG | TEMPERATURE: 97.3 F | HEIGHT: 68 IN | HEART RATE: 69 BPM | WEIGHT: 185 LBS | OXYGEN SATURATION: 100 % | BODY MASS INDEX: 28.04 KG/M2

## 2019-09-09 DIAGNOSIS — R19.7 DIARRHEA, UNSPECIFIED TYPE: Primary | ICD-10-CM

## 2019-09-09 DIAGNOSIS — R19.7 DIARRHEA, UNSPECIFIED TYPE: ICD-10-CM

## 2019-09-09 LAB
ALBUMIN SERPL-MCNC: 4.3 G/DL (ref 3.4–5)
ALP SERPL-CCNC: 47 U/L (ref 40–150)
ALT SERPL W P-5'-P-CCNC: 25 U/L (ref 0–50)
ANION GAP SERPL CALCULATED.3IONS-SCNC: 8 MMOL/L (ref 3–14)
AST SERPL W P-5'-P-CCNC: 29 U/L (ref 0–45)
BASOPHILS # BLD AUTO: 0.1 10E9/L (ref 0–0.2)
BASOPHILS NFR BLD AUTO: 1 %
BILIRUB SERPL-MCNC: 0.7 MG/DL (ref 0.2–1.3)
BUN SERPL-MCNC: 9 MG/DL (ref 7–30)
C DIFF TOX B STL QL: NEGATIVE
CALCIUM SERPL-MCNC: 9.8 MG/DL (ref 8.5–10.1)
CHLORIDE SERPL-SCNC: 106 MMOL/L (ref 94–109)
CO2 SERPL-SCNC: 29 MMOL/L (ref 20–32)
CREAT SERPL-MCNC: 0.9 MG/DL (ref 0.52–1.04)
DIFFERENTIAL METHOD BLD: NORMAL
EOSINOPHIL # BLD AUTO: 0.3 10E9/L (ref 0–0.7)
EOSINOPHIL NFR BLD AUTO: 4.7 %
ERYTHROCYTE [DISTWIDTH] IN BLOOD BY AUTOMATED COUNT: 13.8 % (ref 10–15)
GFR SERPL CREATININE-BSD FRML MDRD: 85 ML/MIN/{1.73_M2}
GLUCOSE SERPL-MCNC: 93 MG/DL (ref 70–99)
HCT VFR BLD AUTO: 42.7 % (ref 35–47)
HGB BLD-MCNC: 13.8 G/DL (ref 11.7–15.7)
LYMPHOCYTES # BLD AUTO: 2.3 10E9/L (ref 0.8–5.3)
LYMPHOCYTES NFR BLD AUTO: 31.6 %
MCH RBC QN AUTO: 27.6 PG (ref 26.5–33)
MCHC RBC AUTO-ENTMCNC: 32.3 G/DL (ref 31.5–36.5)
MCV RBC AUTO: 85 FL (ref 78–100)
MONOCYTES # BLD AUTO: 0.5 10E9/L (ref 0–1.3)
MONOCYTES NFR BLD AUTO: 7 %
NEUTROPHILS # BLD AUTO: 4 10E9/L (ref 1.6–8.3)
NEUTROPHILS NFR BLD AUTO: 55.7 %
PLATELET # BLD AUTO: 274 10E9/L (ref 150–450)
POTASSIUM SERPL-SCNC: 4 MMOL/L (ref 3.4–5.3)
PROT SERPL-MCNC: 7.9 G/DL (ref 6.8–8.8)
RBC # BLD AUTO: 5 10E12/L (ref 3.8–5.2)
SODIUM SERPL-SCNC: 143 MMOL/L (ref 133–144)
SPECIMEN SOURCE: NORMAL
WBC # BLD AUTO: 7.2 10E9/L (ref 4–11)

## 2019-09-09 PROCEDURE — 87177 OVA AND PARASITES SMEARS: CPT | Performed by: PHYSICIAN ASSISTANT

## 2019-09-09 PROCEDURE — 36415 COLL VENOUS BLD VENIPUNCTURE: CPT | Performed by: PHYSICIAN ASSISTANT

## 2019-09-09 PROCEDURE — 85025 COMPLETE CBC W/AUTO DIFF WBC: CPT | Performed by: PHYSICIAN ASSISTANT

## 2019-09-09 PROCEDURE — 87209 SMEAR COMPLEX STAIN: CPT | Performed by: PHYSICIAN ASSISTANT

## 2019-09-09 PROCEDURE — 87493 C DIFF AMPLIFIED PROBE: CPT | Performed by: PHYSICIAN ASSISTANT

## 2019-09-09 PROCEDURE — 87329 GIARDIA AG IA: CPT | Mod: XU | Performed by: PHYSICIAN ASSISTANT

## 2019-09-09 PROCEDURE — 87506 IADNA-DNA/RNA PROBE TQ 6-11: CPT | Performed by: PHYSICIAN ASSISTANT

## 2019-09-09 PROCEDURE — 80053 COMPREHEN METABOLIC PANEL: CPT | Performed by: PHYSICIAN ASSISTANT

## 2019-09-09 PROCEDURE — 87328 CRYPTOSPORIDIUM AG IA: CPT | Performed by: PHYSICIAN ASSISTANT

## 2019-09-09 PROCEDURE — 99213 OFFICE O/P EST LOW 20 MIN: CPT | Performed by: PHYSICIAN ASSISTANT

## 2019-09-09 ASSESSMENT — PAIN SCALES - GENERAL: PAINLEVEL: NO PAIN (0)

## 2019-09-09 ASSESSMENT — MIFFLIN-ST. JEOR: SCORE: 1607.65

## 2019-09-09 NOTE — PROGRESS NOTES
"Sertraline -- for 2 months 50mg  Stopped taking   No abx    CHIEF COMPLAINT:   Chief Complaint   Patient presents with     Urgent Care     Diarrhea     morning diarrhe, watery and explosive x 2 weeks. Pt suddenly discontinued her sertraline, believed that it could be reaction to side effect. tx: none        HPI: Suzie Barrera is a 29 year old female who presents to clinic today for evaluation of diarrhea. Patient reports that for the past 2 weeks she has been experiencing diarrhea. In the AM, she reports that her diarrhea is watery and explosive. Diarrhea is only in the AM and does not occur in the evening. She denies having vomiting or abdominal pain. She has not been hospitalized recently, no recent travel, no antibiotics, hospital stay of hx of IBS. She denies having blood in her stool. She was started on Sertraline by her OB after the birth of her son, she took for approximately 2 months and discontinued as she did not see a difference in anxiety.     History reviewed. No pertinent past medical history.  History reviewed. No pertinent surgical history.  Social History     Tobacco Use     Smoking status: Never Smoker     Smokeless tobacco: Never Used   Substance Use Topics     Alcohol use: No     Current Outpatient Medications   Medication     sertraline (ZOLOFT) 50 MG tablet     No current facility-administered medications for this visit.      No Known Allergies    10 point ROS of systems including Constitutional, Eyes, Respiratory, Cardiovascular, Gastroenterology, Genitourinary, Integumentary, Muscularskeletal, Psychiatric were all negative except for pertinent positives noted in my HPI.        Exam:  /62   Pulse 69   Temp 97.3  F (36.3  C) (Tympanic)   Ht 1.727 m (5' 8\")   Wt 83.9 kg (185 lb)   LMP 09/05/2019 (Exact Date)   SpO2 100%   BMI 28.13 kg/m    Constitutional: healthy, alert and no distress  Head: Normocephalic, atraumatic.  Eyes: conjunctiva clear, no drainage  ENT: TMs clear and " shiny shivani, nasal mucosa pink and moist, throat without tonsillar hypertrophy or erythema  Neck: neck is supple, no cervical lymphadenopathy or nuchal rigidity  Cardiovascular: RRR  Respiratory: CTA bilaterally, no rhonchi or rales  Gastrointestinal: soft and nontender  Skin: no rashes  Neurologic: Speech clear, gait normal. Moves all extremities.    Results for orders placed or performed in visit on 09/09/19   CBC with platelets and differential   Result Value Ref Range    WBC 7.2 4.0 - 11.0 10e9/L    RBC Count 5.00 3.8 - 5.2 10e12/L    Hemoglobin 13.8 11.7 - 15.7 g/dL    Hematocrit 42.7 35.0 - 47.0 %    MCV 85 78 - 100 fl    MCH 27.6 26.5 - 33.0 pg    MCHC 32.3 31.5 - 36.5 g/dL    RDW 13.8 10.0 - 15.0 %    Platelet Count 274 150 - 450 10e9/L    % Neutrophils 55.7 %    % Lymphocytes 31.6 %    % Monocytes 7.0 %    % Eosinophils 4.7 %    % Basophils 1.0 %    Absolute Neutrophil 4.0 1.6 - 8.3 10e9/L    Absolute Lymphocytes 2.3 0.8 - 5.3 10e9/L    Absolute Monocytes 0.5 0.0 - 1.3 10e9/L    Absolute Eosinophils 0.3 0.0 - 0.7 10e9/L    Absolute Basophils 0.1 0.0 - 0.2 10e9/L    Diff Method Automated Method    Comprehensive metabolic panel   Result Value Ref Range    Sodium 143 133 - 144 mmol/L    Potassium 4.0 3.4 - 5.3 mmol/L    Chloride 106 94 - 109 mmol/L    Carbon Dioxide 29 20 - 32 mmol/L    Anion Gap 8 3 - 14 mmol/L    Glucose 93 70 - 99 mg/dL    Urea Nitrogen 9 7 - 30 mg/dL    Creatinine 0.90 0.52 - 1.04 mg/dL    GFR Estimate 85 >60 mL/min/[1.73_m2]    GFR Estimate If Black 99 >60 mL/min/[1.73_m2]    Calcium 9.8 8.5 - 10.1 mg/dL    Bilirubin Total 0.7 0.2 - 1.3 mg/dL    Albumin 4.3 3.4 - 5.0 g/dL    Protein Total 7.9 6.8 - 8.8 g/dL    Alkaline Phosphatase 47 40 - 150 U/L    ALT 25 0 - 50 U/L    AST 29 0 - 45 U/L         ASSESSMENT/PLAN:  1. Diarrhea, unspecified type  Unclear cause of diarrhea, will screen stools to ensure no infection.   If all negative, follow-up with PCP in the next week for further evaluation.    - CBC with platelets and differential  - Comprehensive metabolic panel  - Enteric Bacteria and Virus Panel by MARILYN Stool; Future  - Ova and Parasite Exam Routine; Future  - Clostridium difficile toxin B PCR; Future  - Cryptosporidium/Giardia Immunoassay; Future      Diagnosis and treatment plan was reviewed with patient and/or family.   We went over any labs or imaging. Discussed worsening symptoms or little to no relief despite treatment plan to follow-up with PCP or return to clinic.  Patient verbalizes understanding. All questions were addressed and answered.   Dayna Barnes PA-C

## 2019-09-10 ENCOUNTER — NURSE TRIAGE (OUTPATIENT)
Dept: NURSING | Facility: CLINIC | Age: 29
End: 2019-09-10

## 2019-09-10 LAB
C COLI+JEJUNI+LARI FUSA STL QL NAA+PROBE: NOT DETECTED
C PARVUM AG STL QL IA: NEGATIVE
EC STX1 GENE STL QL NAA+PROBE: NOT DETECTED
EC STX2 GENE STL QL NAA+PROBE: NOT DETECTED
ENTERIC PATHOGEN COMMENT: NORMAL
G LAMBLIA AG STL QL IA: NEGATIVE
NOROV GI+II ORF1-ORF2 JNC STL QL NAA+PR: NOT DETECTED
O+P STL MICRO: NORMAL
O+P STL MICRO: NORMAL
RVA NSP5 STL QL NAA+PROBE: NOT DETECTED
SALMONELLA SP RPOD STL QL NAA+PROBE: NOT DETECTED
SHIGELLA SP+EIEC IPAH STL QL NAA+PROBE: NOT DETECTED
SPECIMEN SOURCE: NORMAL
SPECIMEN SOURCE: NORMAL
V CHOL+PARA RFBL+TRKH+TNAA STL QL NAA+PR: NOT DETECTED
Y ENTERO RECN STL QL NAA+PROBE: NOT DETECTED

## 2019-09-10 NOTE — TELEPHONE ENCOUNTER
"\"I am just very fatigued.\" Patient was seen in [facility] yesterday. Stating she is waiting on stool samples. Patient is calling to confirm blood work was negative. Reviewed normal CVC and Metabolic Panal. Ova and parasites in process, cryptosporidium in process.  Patient reporting voiding \"in middle of the night.\" x 1 loose stool this morning. Afebrile. Reporting increased fatigue. Reviewed signs and symptoms of dehydration. Patient agrees to increase fluid intake.   Advised to call back with any change or increase in symptoms. Caller verbalized understanding. Denies further questions.    Per guidelines advised to be seen with in 24 hours. Caller verbalized understanding. Denies further questions.    Almita Pike RN  [facility]      Reason for Disposition    [1] MODERATE diarrhea (e.g., 4-6 times / day more than normal) AND [2] present > 48 hours (2 days)    Additional Information    Negative: Shock suspected (e.g., cold/pale/clammy skin, too weak to stand, low BP, rapid pulse)    Negative: Difficult to awaken or acting confused (e.g., disoriented, slurred speech)    Negative: Sounds like a life-threatening emergency to the triager    Negative: Vomiting also present and worse than the diarrhea    Negative: [1] Blood in stool AND [2] without diarrhea    Negative: Diarrhea in a cancer patient who is currently (or recently) receiving chemotherapy or radiation therapy, or cancer patient who has metastatic or end-stage cancer and is receiving palliative care    Negative: [1] SEVERE abdominal pain (e.g., excruciating) AND [2] present > 1 hour    Negative: [1] SEVERE abdominal pain AND [2] age > 60    Negative: [1] Blood in the stool AND [2] moderate or large amount of blood    Negative: Black or tarry bowel movements  (Exception: chronic-unchanged  black-grey bowel movements AND is taking iron pills or Pepto-bismol)    Negative: [1] Drinking very little AND [2] dehydration suspected (e.g., no urine > 12 " hours, very dry mouth, very lightheaded)    Negative: Patient sounds very sick or weak to the triager    Negative: [1] SEVERE diarrhea (e.g., 7 or more times / day more than normal) AND [2]  age > 60 years    Negative: [1] Constant abdominal pain AND [2] present > 2 hours    Negative: [1] Fever > 103 F (39.4 C) AND [2] not able to get the fever down using Fever Care Advice    Negative: [1] SEVERE diarrhea (e.g., 7 or more times / day more than normal) AND [2] present > 24 hours (1 day)    Protocols used: DIARRHEA-A-AH

## 2019-09-11 ENCOUNTER — NURSE TRIAGE (OUTPATIENT)
Dept: NURSING | Facility: CLINIC | Age: 29
End: 2019-09-11

## 2019-09-11 NOTE — TELEPHONE ENCOUNTER
Patient requesting lab results from 9/9/19. Reviewed negative test results.    Caller verbalized understanding. Denies further questions.    Almita Pike RN  [facility]

## 2019-09-13 ENCOUNTER — TELEPHONE (OUTPATIENT)
Dept: URGENT CARE | Facility: URGENT CARE | Age: 29
End: 2019-09-13

## 2019-09-13 NOTE — TELEPHONE ENCOUNTER
Called patient to check in from visit on Monday, 9/9/2019. No answer LVM.   If call back, would like her to follow-up with PCP for further eval.

## 2020-01-14 ENCOUNTER — HOSPITAL ENCOUNTER (EMERGENCY)
Facility: CLINIC | Age: 30
Discharge: HOME | End: 2020-01-14
Attending: PHYSICIAN ASSISTANT | Admitting: PHYSICIAN ASSISTANT
Payer: COMMERCIAL

## 2020-01-14 VITALS
DIASTOLIC BLOOD PRESSURE: 65 MMHG | OXYGEN SATURATION: 100 % | TEMPERATURE: 98.3 F | RESPIRATION RATE: 16 BRPM | SYSTOLIC BLOOD PRESSURE: 139 MMHG

## 2020-01-14 DIAGNOSIS — R21 PAPULAR RASH: ICD-10-CM

## 2020-01-14 PROCEDURE — 99282 EMERGENCY DEPT VISIT SF MDM: CPT

## 2020-01-14 RX ORDER — TRIAMCINOLONE ACETONIDE 1 MG/G
CREAM TOPICAL 2 TIMES DAILY
Qty: 30 G | Refills: 0 | Status: SHIPPED | OUTPATIENT
Start: 2020-01-14 | End: 2020-01-28

## 2020-01-14 RX ORDER — PREDNISONE 20 MG/1
TABLET ORAL
Qty: 10 TABLET | Refills: 0 | Status: SHIPPED | OUTPATIENT
Start: 2020-01-14 | End: 2020-01-14

## 2020-01-14 RX ORDER — CETIRIZINE HYDROCHLORIDE 10 MG/1
10 TABLET ORAL DAILY
Qty: 14 TABLET | Refills: 0 | Status: SHIPPED | OUTPATIENT
Start: 2020-01-14

## 2020-01-14 ASSESSMENT — ENCOUNTER SYMPTOMS
NUMBNESS: 1
COLOR CHANGE: 1

## 2020-01-14 NOTE — ED TRIAGE NOTES
Rash on back of both arms.  Reports the rash is itchy.  No relief with Benadryl.  Applied hydrocortisone cream this morning but that seemed to make it worse.

## 2020-01-14 NOTE — ED PROVIDER NOTES
History     Chief Complaint:  Rash      HPI   Suzie Barrera is a 29 year old female who presents with a rash. Patient has a rash on the back of both of her arms which spreads into her chest and back -- there is nothing on her lower extremities. She has had this for the last one week after using a chlorinated pool on New Years [place]; she has had a chlorine rash before, but has never had one this bad. She has taken benadryl for the last two nights without relief. This morning, she applied hydrocortisone cream all over her rash and the rash became more red and pruritic. There is now drainage from the rash. The hydrocortisone is a new medication for her. After her rash seemed to worsen, she also began feeling left sided chest tightness, left arm tightness, and numbness. She states that she has sensitive skin at baseline, but denies a history of eczema. Denies fevers and chills. Denies new medications outside of the hydrocortisone, and she has no new soaps, lotions, or fragrances. She has no known medication allergies.     Allergies:  No Known Drug Allergies     Medications:    Sertraline     Past Medical History:    Varicella   Anxiety     Past Surgical History:    History reviewed. No pertinent past surgical history.     Family History:    History reviewed. No pertinent family history.      Social History:  The patient was alone.   Smoking Status: Never  Smokeless Tobacco: Never  Alcohol Use: No  Marital Status:        Review of Systems   Cardiovascular: Positive for chest pain.   Skin: Positive for color change and rash.   Allergic/Immunologic: Negative for environmental allergies and food allergies.   Neurological: Positive for numbness.   All other systems reviewed and are negative.      Physical Exam     Patient Vitals for the past 24 hrs:   BP Temp Temp src Heart Rate Resp SpO2   01/14/20 1506 139/65 -- -- -- -- --   01/14/20 1504 -- 98.3  F (36.8  C) Temporal 62 16 100 %       Physical  Exam  Constitutional: Pleasant. Cooperative.  Eyes: Pupils equally round  HENT: Head is normal in appearance. Oropharynx is normal with moist mucus membranes.  Cardiovascular: Regular rate and rhythm without murmurs.  Respiratory: Normal respiratory effort, lungs clear to auscultation  Musculoskeletal: No asymmetry  Skin: Erythematous papular rash to bilateral proximal extremities as well as axilla. Sparing remainder of body. No pustules or weeping noted. Scattered excoriations as well.   Neurologic: Cranial nerves grossly intact, normal cognition, no apparent deficits.  Psychiatric: Normal affect.  Nursing notes and vital signs reviewed.    Emergency Department Course     Emergency Department Course:  Past medical records, nursing notes, and vitals reviewed.    1513 I performed an exam of the patient as documented above.     1531 I rechecked the patient and discussed the results of her workup thus far.     Findings and plan explained to the Patient. Patient discharged home with instructions regarding supportive care, medications, and reasons to return. The importance of close follow-up was reviewed. The patient was prescribed Zyrtec and Kenalog 0.1% external cream.     Impression & Plan     Medical Decision Making:  Suzie Barrera is a 29 year old female who presents to the ED for evaluation of a rash.  Rash is been present for the past week.  Rash is pruritic and erythematous. No fever.  See HPI as above for additional details.  Vitals and physical exam as above.  Differential considered includes contact dermatitis, pityriasis, viral exanthem, zoster, tinea, atopic dermatitis, urticaria, SJS, among others.  Rash seems most consistent with contact versus irritant dermatitis.  Patient has been using low potency hydrocortisone cream without improvement of her symptoms.  Will place patient on medium potency steroid and advised her to use second-generation antihistamine for improvement of pruritis.  Advised  follow-up with PCP if not improving or worsening. Discussed reasons to return. All questions answered. Patient discharged to home in stable condition.    Diagnosis:    ICD-10-CM    1. Papular rash R21        Disposition:  Discharged to home.    Discharge Medications:  New Prescriptions    CETIRIZINE (ZYRTEC) 10 MG TABLET    Take 1 tablet (10 mg) by mouth daily    TRIAMCINOLONE (KENALOG) 0.1 % EXTERNAL CREAM    Apply topically 2 times daily for 14 days       Scribe Disclosure:  IValeria, am serving as a scribe at 3:08 PM on 1/14/2020 to document services personally performed by Srinivas Huertas PA-C based on my observations and the provider's statements to me.   1/14/2020   [facility] EMERGENCY DEPARTMENT       Srinivas Huertas PA-C  01/14/20 1539

## 2020-01-14 NOTE — ED AVS SNAPSHOT
[facility] Emergency Department  [address]  [facility] [address]  Phone:  [phone]  Fax:  [phone]                                    Suzie Barrera   MRN: [id number]    Department:  [facility] Emergency Department   Date of Visit:  1/14/2020           After Visit Summary Signature Page    I have received my discharge instructions, and my questions have been answered. I have discussed any challenges I see with this plan with the nurse or doctor.    ..........................................................................................................................................  Patient/Patient Representative Signature      ..........................................................................................................................................  Patient Representative Print Name and Relationship to Patient    ..................................................               ................................................  Date                                   Time    ..........................................................................................................................................  Reviewed by Signature/Title    ...................................................              ..............................................  Date                                               Time          22EPIC Rev 08/18

## 2020-09-15 NOTE — PROVIDER NOTIFICATION
02/16/19 0935   Provider Notification   Provider Name/Title Dr. Velázquez   Method of Notification Phone   Request Evaluate - Remote   Notification Reason SVE;Status Update   MD updated on SVE of 2/60/-1.  TORB for Pitocin augmentation per protocol.     Statement Selected Statement Selected Statement Selected

## 2021-09-07 NOTE — DISCHARGE INSTRUCTIONS
Data: [facility] at 1155.   Reason for maternal/fetal assessment per patient is Fall  . Patient is a . Prenatal record reviewed.      Obstetric History       T0      L0     SAB0   TAB0   Ectopic0   Multiple0   Live Births0       # Outcome Date GA Lbr Piotr/2nd Weight Sex Delivery Anes PTL Lv   1 Current                  Medical History: No past medical history on file.. Gestational Age 35w3d. VSS. Cervix: closed.  Fetal movement present. Patient denies cramping, backache, vaginal discharge, pelvic pressure, UTI symptoms, GI problems, bloody show, vaginal bleeding, edema, headache, visual disturbances, epigastric or URQ pain, abdominal pain, rupture of membranes. Support person Ja present.  Action: Verbal consent for EFM. Triage assessment completed. EFM applied for fetal non-stress test do to patient falling on her right knee and right elbow. Uterine assessment done with external uterine monitor. Fetal assessment: Presumed adequate fetal oxygenation documented (see flow record). Patient education given on fetal movement counts and . Patient instructed to report change in fetal movement, vaginal leaking of fluid or bleeding, abdominal pain, or any concerns related to the pregnancy to her nurse/physician.   Response: Dr. Rubio informed of patient's admission. Plan per provider is to obtain non-stress test and cervical exam. Patient verbalized understanding of education and verbalized agreement with plan. Discharged ambulatory at 1330.       On A Scale Of 0 To 10 How Would You Rate Your Itching?: 4 How Did Your Itching Occur?: sudden in onset (over a period of weeks to a few months) How Severe Is Your Itching?: moderate

## 2022-01-26 ENCOUNTER — LAB REQUISITION (OUTPATIENT)
Dept: LAB | Facility: CLINIC | Age: 32
End: 2022-01-26

## 2022-01-26 PROCEDURE — 86706 HEP B SURFACE ANTIBODY: CPT | Performed by: INTERNAL MEDICINE

## 2022-01-26 PROCEDURE — 86481 TB AG RESPONSE T-CELL SUSP: CPT | Performed by: INTERNAL MEDICINE

## 2022-01-26 PROCEDURE — 86787 VARICELLA-ZOSTER ANTIBODY: CPT | Performed by: INTERNAL MEDICINE

## 2022-01-27 LAB
HBV SURFACE AB SERPL IA-ACNC: 188.47 M[IU]/ML
VZV IGG SER QL IA: 817.8 INDEX
VZV IGG SER QL IA: POSITIVE

## 2022-01-28 LAB
GAMMA INTERFERON BACKGROUND BLD IA-ACNC: 0.03 IU/ML
M TB IFN-G BLD-IMP: NEGATIVE
M TB IFN-G CD4+ BCKGRND COR BLD-ACNC: 9.24 IU/ML
MITOGEN IGNF BCKGRD COR BLD-ACNC: 0 IU/ML
MITOGEN IGNF BCKGRD COR BLD-ACNC: 0.01 IU/ML
QUANTIFERON MITOGEN: 9.27 IU/ML
QUANTIFERON NIL TUBE: 0.03 IU/ML
QUANTIFERON TB1 TUBE: 0.04 IU/ML
QUANTIFERON TB2 TUBE: 0.03

## 2023-02-24 ENCOUNTER — OFFICE VISIT (OUTPATIENT)
Dept: URGENT CARE | Facility: URGENT CARE | Age: 33
End: 2023-02-24
Payer: COMMERCIAL

## 2023-02-24 VITALS
TEMPERATURE: 97.7 F | RESPIRATION RATE: 16 BRPM | HEART RATE: 68 BPM | SYSTOLIC BLOOD PRESSURE: 120 MMHG | OXYGEN SATURATION: 100 % | DIASTOLIC BLOOD PRESSURE: 81 MMHG

## 2023-02-24 DIAGNOSIS — S60.415A ABRASION OF LEFT RING FINGER WITH INFECTION: Primary | ICD-10-CM

## 2023-02-24 DIAGNOSIS — L08.9 ABRASION OF LEFT RING FINGER WITH INFECTION: Primary | ICD-10-CM

## 2023-02-24 PROCEDURE — 99203 OFFICE O/P NEW LOW 30 MIN: CPT | Performed by: PHYSICIAN ASSISTANT

## 2023-02-24 ASSESSMENT — ENCOUNTER SYMPTOMS
COLOR CHANGE: 1
WOUND: 1

## 2023-02-24 NOTE — PROGRESS NOTES
Assessment & Plan      1. Abrasion of left ring finger with infection  -There is cellulitis present. Patient has swelling and erythema  - amoxicillin-clavulanate (AUGMENTIN) 875-125 MG tablet; Take 1 tablet by mouth 2 times daily for 10 days  Dispense: 20 tablet; Refill: 0    2. Ring entrapment and removal  -Ring entrapment of the left middle finger.  -There is swelling and edema on the left middle finger.    Dr. Matthew Estes performed the procedure for cutting the ring. A hand-operated ring cutter was used. The ring was cut in two places to permit removal. Skin was protected from sharp edges after the initial cut with gauze. The cut ring was removed from the finger by pulling apart with hemostats. Bacitracin and bandage applied.       Patient Instructions   Take antibiotics as indicated  Follow up in the clinic if symptoms worsen    Return if symptoms worsen or fail to improve, for Follow up, 3- 5 days.    At the end of the encounter, I discussed results, diagnosis, medications. Discussed red flags for immediate return to clinic/ER, as well as indications for follow up if no improvement. Patient understood and agreed to plan. Patient was stable for discharge.    Hector Larsen is a 32 year old female who presents to clinic today for the following health issues:  Chief Complaint   Patient presents with     Hand Pain     2 weeks, left ring finger, swollen, discharge     HPI  Patient reports left ring finger pain, irritation and swelling. Symptoms started 2 weeks ago. She reports noticing a small cut, pimple under the ring finger which  progressively got worse. She has noticed purulent discharge. Her finger is swollen and she can not take her ring out. She is wondering if we can cut the ring off. She has tried putting neosporin on the skin which did not help.   She denies fever and chills    Review of Systems   Skin: Positive for color change and wound.   All other systems reviewed and are  negative.      Problem List:  2019-02: Single liveborn infant delivered vaginally  2019-02: Trisomy 21 of fetus, current pregnancy, single gestation  2019-02: High-risk pregnancy, unspecified trimester  2019-02: Supervision of high-risk pregnancy  2017-11: Indication for care in labor and delivery, delivered  2017-11: Encounter for triage in pregnant patient  2017-10: Indication for care in labor or delivery  2017-10: Fall      No past medical history on file.    Social History     Tobacco Use     Smoking status: Never     Smokeless tobacco: Never   Substance Use Topics     Alcohol use: No           Objective    /81 (BP Location: Right arm)   Pulse 68   Temp 97.7  F (36.5  C) (Tympanic)   Resp 16   SpO2 100%   Physical Exam  Constitutional:       Appearance: Normal appearance.   HENT:      Head: Normocephalic.   Cardiovascular:      Rate and Rhythm: Normal rate and regular rhythm.   Pulmonary:      Effort: Pulmonary effort is normal.      Breath sounds: Normal breath sounds.   Musculoskeletal:      Cervical back: Normal range of motion and neck supple.   Lymphadenopathy:      Head:      Right side of head: No submental, submandibular or tonsillar adenopathy.      Left side of head: No submental, submandibular or tonsillar adenopathy.   Skin:     General: Skin is warm and dry.      Capillary Refill: Capillary refill takes 2 to 3 seconds.      Findings: Abrasion, erythema and rash present.          Neurological:      Mental Status: She is alert and oriented to person, place, and time.                  Alla Sandoval PA-C